# Patient Record
Sex: FEMALE | Race: WHITE | NOT HISPANIC OR LATINO | Employment: OTHER | URBAN - METROPOLITAN AREA
[De-identification: names, ages, dates, MRNs, and addresses within clinical notes are randomized per-mention and may not be internally consistent; named-entity substitution may affect disease eponyms.]

---

## 2020-04-11 ENCOUNTER — HOSPITAL ENCOUNTER (INPATIENT)
Facility: HOSPITAL | Age: 80
LOS: 4 days | Discharge: HOME | DRG: 843 | End: 2020-04-15
Attending: EMERGENCY MEDICINE | Admitting: INTERNAL MEDICINE
Payer: MEDICARE

## 2020-04-11 DIAGNOSIS — G93.40 ENCEPHALOPATHY: ICD-10-CM

## 2020-04-11 DIAGNOSIS — N30.00 ACUTE CYSTITIS WITHOUT HEMATURIA: ICD-10-CM

## 2020-04-11 DIAGNOSIS — E83.52 HYPERCALCEMIA: ICD-10-CM

## 2020-04-11 DIAGNOSIS — E83.52 HYPERCALCEMIA OF MALIGNANCY: Primary | ICD-10-CM

## 2020-04-11 DIAGNOSIS — I10 HYPERTENSION, UNSPECIFIED TYPE: ICD-10-CM

## 2020-04-11 DIAGNOSIS — G93.41 ENCEPHALOPATHY, METABOLIC: ICD-10-CM

## 2020-04-11 PROBLEM — I50.32 CHRONIC DIASTOLIC HEART FAILURE: Status: ACTIVE | Noted: 2020-04-11

## 2020-04-11 LAB
ALBUMIN SERPL BCP-MCNC: 3.7 G/DL (ref 3.5–5.2)
ALP SERPL-CCNC: 259 U/L (ref 55–135)
ALT SERPL W/O P-5'-P-CCNC: 71 U/L (ref 10–44)
ANION GAP SERPL CALC-SCNC: 11 MMOL/L (ref 8–16)
AST SERPL-CCNC: 96 U/L (ref 10–40)
BACTERIA #/AREA URNS HPF: ABNORMAL /HPF
BASOPHILS # BLD AUTO: 0.01 K/UL (ref 0–0.2)
BASOPHILS NFR BLD: 0.2 % (ref 0–1.9)
BILIRUB SERPL-MCNC: 0.6 MG/DL (ref 0.1–1)
BILIRUB UR QL STRIP: NEGATIVE
BNP SERPL-MCNC: 152 PG/ML (ref 0–99)
BUN SERPL-MCNC: 23 MG/DL (ref 8–23)
CALCIUM SERPL-MCNC: 14.1 MG/DL (ref 8.7–10.5)
CHLORIDE SERPL-SCNC: 103 MMOL/L (ref 95–110)
CK SERPL-CCNC: 59 U/L (ref 20–180)
CLARITY UR: CLEAR
CO2 SERPL-SCNC: 24 MMOL/L (ref 23–29)
COLOR UR: YELLOW
CREAT SERPL-MCNC: 2 MG/DL (ref 0.5–1.4)
CRP SERPL-MCNC: 7.6 MG/L (ref 0–8.2)
DIFFERENTIAL METHOD: ABNORMAL
EOSINOPHIL # BLD AUTO: 0.1 K/UL (ref 0–0.5)
EOSINOPHIL NFR BLD: 1.1 % (ref 0–8)
ERYTHROCYTE [DISTWIDTH] IN BLOOD BY AUTOMATED COUNT: 14.5 % (ref 11.5–14.5)
EST. GFR  (AFRICAN AMERICAN): 26.6 ML/MIN/1.73 M^2
EST. GFR  (NON AFRICAN AMERICAN): 23.1 ML/MIN/1.73 M^2
GLUCOSE SERPL-MCNC: 359 MG/DL (ref 70–110)
GLUCOSE UR QL STRIP: ABNORMAL
HCT VFR BLD AUTO: 40.7 % (ref 37–48.5)
HGB BLD-MCNC: 12.6 G/DL (ref 12–16)
HGB UR QL STRIP: ABNORMAL
HYALINE CASTS #/AREA URNS LPF: 22 /LPF
IMM GRANULOCYTES # BLD AUTO: 0.02 K/UL (ref 0–0.04)
IMM GRANULOCYTES NFR BLD AUTO: 0.3 % (ref 0–0.5)
INFLUENZA A, MOLECULAR: NEGATIVE
INFLUENZA B, MOLECULAR: NEGATIVE
KETONES UR QL STRIP: NEGATIVE
LACTATE SERPL-SCNC: 2.3 MMOL/L (ref 0.5–2.2)
LACTATE SERPL-SCNC: 2.3 MMOL/L (ref 0.5–2.2)
LDH SERPL L TO P-CCNC: 226 U/L (ref 110–260)
LEUKOCYTE ESTERASE UR QL STRIP: NEGATIVE
LIPASE SERPL-CCNC: 38 U/L (ref 4–60)
LYMPHOCYTES # BLD AUTO: 1.2 K/UL (ref 1–4.8)
LYMPHOCYTES NFR BLD: 19.5 % (ref 18–48)
MCH RBC QN AUTO: 28.2 PG (ref 27–31)
MCHC RBC AUTO-ENTMCNC: 31 G/DL (ref 32–36)
MCV RBC AUTO: 91 FL (ref 82–98)
MICROSCOPIC COMMENT: ABNORMAL
MONOCYTES # BLD AUTO: 0.7 K/UL (ref 0.3–1)
MONOCYTES NFR BLD: 11.3 % (ref 4–15)
NEUTROPHILS # BLD AUTO: 4.3 K/UL (ref 1.8–7.7)
NEUTROPHILS NFR BLD: 67.6 % (ref 38–73)
NITRITE UR QL STRIP: POSITIVE
NRBC BLD-RTO: 0 /100 WBC
PH UR STRIP: 5 [PH] (ref 5–8)
PLATELET # BLD AUTO: 275 K/UL (ref 150–350)
PMV BLD AUTO: 11 FL (ref 9.2–12.9)
POCT GLUCOSE: 351 MG/DL (ref 70–110)
POTASSIUM SERPL-SCNC: 4.5 MMOL/L (ref 3.5–5.1)
PROT SERPL-MCNC: 7.8 G/DL (ref 6–8.4)
PROT UR QL STRIP: ABNORMAL
RBC # BLD AUTO: 4.47 M/UL (ref 4–5.4)
RBC #/AREA URNS HPF: 0 /HPF (ref 0–4)
SARS-COV-2 RDRP RESP QL NAA+PROBE: NEGATIVE
SODIUM SERPL-SCNC: 138 MMOL/L (ref 136–145)
SP GR UR STRIP: 1.02 (ref 1–1.03)
SPECIMEN SOURCE: NORMAL
SQUAMOUS #/AREA URNS HPF: 5 /HPF
TROPONIN I SERPL DL<=0.01 NG/ML-MCNC: 0.01 NG/ML (ref 0–0.03)
URN SPEC COLLECT METH UR: ABNORMAL
UROBILINOGEN UR STRIP-ACNC: 1 EU/DL
WBC # BLD AUTO: 6.31 K/UL (ref 3.9–12.7)
WBC #/AREA URNS HPF: 35 /HPF (ref 0–5)
WBC CLUMPS URNS QL MICRO: ABNORMAL
YEAST URNS QL MICRO: ABNORMAL

## 2020-04-11 PROCEDURE — 87088 URINE BACTERIA CULTURE: CPT

## 2020-04-11 PROCEDURE — 63600175 PHARM REV CODE 636 W HCPCS: Performed by: EMERGENCY MEDICINE

## 2020-04-11 PROCEDURE — 87077 CULTURE AEROBIC IDENTIFY: CPT

## 2020-04-11 PROCEDURE — 96375 TX/PRO/DX INJ NEW DRUG ADDON: CPT

## 2020-04-11 PROCEDURE — 85025 COMPLETE CBC W/AUTO DIFF WBC: CPT

## 2020-04-11 PROCEDURE — 83615 LACTATE (LD) (LDH) ENZYME: CPT | Mod: ER

## 2020-04-11 PROCEDURE — 82962 GLUCOSE BLOOD TEST: CPT | Mod: 59

## 2020-04-11 PROCEDURE — 99291 CRITICAL CARE FIRST HOUR: CPT | Mod: 25

## 2020-04-11 PROCEDURE — 86140 C-REACTIVE PROTEIN: CPT | Mod: ER

## 2020-04-11 PROCEDURE — 80053 COMPREHEN METABOLIC PANEL: CPT | Mod: ER

## 2020-04-11 PROCEDURE — 83880 ASSAY OF NATRIURETIC PEPTIDE: CPT

## 2020-04-11 PROCEDURE — 11000001 HC ACUTE MED/SURG PRIVATE ROOM

## 2020-04-11 PROCEDURE — 82550 ASSAY OF CK (CPK): CPT | Mod: ER

## 2020-04-11 PROCEDURE — 87186 SC STD MICRODIL/AGAR DIL: CPT

## 2020-04-11 PROCEDURE — 81000 URINALYSIS NONAUTO W/SCOPE: CPT

## 2020-04-11 PROCEDURE — 96365 THER/PROPH/DIAG IV INF INIT: CPT

## 2020-04-11 PROCEDURE — 83605 ASSAY OF LACTIC ACID: CPT | Mod: ER

## 2020-04-11 PROCEDURE — 96361 HYDRATE IV INFUSION ADD-ON: CPT

## 2020-04-11 PROCEDURE — 36415 COLL VENOUS BLD VENIPUNCTURE: CPT

## 2020-04-11 PROCEDURE — U0002 COVID-19 LAB TEST NON-CDC: HCPCS

## 2020-04-11 PROCEDURE — 87040 BLOOD CULTURE FOR BACTERIA: CPT

## 2020-04-11 PROCEDURE — 87086 URINE CULTURE/COLONY COUNT: CPT

## 2020-04-11 PROCEDURE — 83690 ASSAY OF LIPASE: CPT | Mod: ER

## 2020-04-11 PROCEDURE — 25000003 PHARM REV CODE 250: Performed by: EMERGENCY MEDICINE

## 2020-04-11 PROCEDURE — 87502 INFLUENZA DNA AMP PROBE: CPT

## 2020-04-11 PROCEDURE — 82728 ASSAY OF FERRITIN: CPT

## 2020-04-11 PROCEDURE — 84484 ASSAY OF TROPONIN QUANT: CPT

## 2020-04-11 RX ORDER — NICARDIPINE HYDROCHLORIDE 0.2 MG/ML
5 INJECTION INTRAVENOUS CONTINUOUS
Status: DISCONTINUED | OUTPATIENT
Start: 2020-04-12 | End: 2020-04-12

## 2020-04-11 RX ORDER — SIMVASTATIN 40 MG/1
40 TABLET, FILM COATED ORAL NIGHTLY
Status: ON HOLD | COMMUNITY
Start: 2013-01-20 | End: 2020-04-15 | Stop reason: HOSPADM

## 2020-04-11 RX ORDER — HYDROCODONE BITARTRATE AND ACETAMINOPHEN 5; 325 MG/1; MG/1
1 TABLET ORAL
Status: ON HOLD | COMMUNITY
Start: 2020-01-09 | End: 2020-04-15 | Stop reason: HOSPADM

## 2020-04-11 RX ORDER — METOLAZONE 5 MG/1
5 TABLET ORAL DAILY
Status: ON HOLD | COMMUNITY
Start: 2019-08-19 | End: 2020-04-15 | Stop reason: HOSPADM

## 2020-04-11 RX ORDER — ALPRAZOLAM 0.5 MG/1
0.5 TABLET ORAL 2 TIMES DAILY
COMMUNITY
Start: 2020-02-20

## 2020-04-11 RX ORDER — HYDRALAZINE HYDROCHLORIDE 50 MG/1
50 TABLET, FILM COATED ORAL 2 TIMES DAILY
COMMUNITY
Start: 2020-01-10

## 2020-04-11 RX ORDER — CALCITRIOL 0.25 UG/1
CAPSULE ORAL
Status: ON HOLD | COMMUNITY
Start: 2020-02-02 | End: 2020-04-15 | Stop reason: HOSPADM

## 2020-04-11 RX ORDER — AMIODARONE HYDROCHLORIDE 200 MG/1
200 TABLET ORAL DAILY
COMMUNITY
Start: 2020-02-06

## 2020-04-11 RX ORDER — NIFEDIPINE 90 MG/1
90 TABLET, EXTENDED RELEASE ORAL DAILY
COMMUNITY
Start: 2020-04-07

## 2020-04-11 RX ORDER — METOPROLOL SUCCINATE 25 MG/1
25 TABLET, EXTENDED RELEASE ORAL DAILY
COMMUNITY
Start: 2020-04-04

## 2020-04-11 RX ORDER — LEVOTHYROXINE SODIUM 88 UG/1
88 TABLET ORAL
COMMUNITY
Start: 2020-04-07

## 2020-04-11 RX ORDER — POTASSIUM CHLORIDE 20 MEQ/1
20 TABLET, EXTENDED RELEASE ORAL
COMMUNITY

## 2020-04-11 RX ORDER — HYDRALAZINE HYDROCHLORIDE 25 MG/1
25 TABLET, FILM COATED ORAL
Status: COMPLETED | OUTPATIENT
Start: 2020-04-11 | End: 2020-04-11

## 2020-04-11 RX ORDER — DOXAZOSIN 4 MG/1
TABLET ORAL
Status: ON HOLD | COMMUNITY
Start: 2020-04-07 | End: 2020-04-15 | Stop reason: HOSPADM

## 2020-04-11 RX ORDER — APIXABAN 2.5 MG/1
2.5 TABLET, FILM COATED ORAL 2 TIMES DAILY
Status: ON HOLD | COMMUNITY
Start: 2020-03-25 | End: 2020-04-15 | Stop reason: SDUPTHER

## 2020-04-11 RX ORDER — CALCITONIN SALMON 200 [USP'U]/ML
4 INJECTION, SOLUTION INTRAMUSCULAR; SUBCUTANEOUS ONCE
Status: COMPLETED | OUTPATIENT
Start: 2020-04-12 | End: 2020-04-12

## 2020-04-11 RX ORDER — HYDRALAZINE HYDROCHLORIDE 20 MG/ML
10 INJECTION INTRAMUSCULAR; INTRAVENOUS
Status: COMPLETED | OUTPATIENT
Start: 2020-04-11 | End: 2020-04-11

## 2020-04-11 RX ORDER — GLIMEPIRIDE 2 MG/1
2 TABLET ORAL 2 TIMES DAILY
Status: ON HOLD | COMMUNITY
Start: 2020-03-10 | End: 2020-04-15 | Stop reason: SDUPTHER

## 2020-04-11 RX ORDER — FUROSEMIDE 40 MG/1
40 TABLET ORAL 2 TIMES DAILY
Status: ON HOLD | COMMUNITY
Start: 2019-08-19 | End: 2020-04-15 | Stop reason: SDUPTHER

## 2020-04-11 RX ORDER — BENZONATATE 100 MG/1
CAPSULE ORAL
COMMUNITY
Start: 2020-02-20

## 2020-04-11 RX ORDER — TRAMADOL HYDROCHLORIDE 50 MG/1
TABLET ORAL
COMMUNITY
Start: 2019-09-27

## 2020-04-11 RX ORDER — PANTOPRAZOLE SODIUM 40 MG/1
40 TABLET, DELAYED RELEASE ORAL DAILY
COMMUNITY
Start: 2020-01-31

## 2020-04-11 RX ORDER — SODIUM CHLORIDE 9 MG/ML
1000 INJECTION, SOLUTION INTRAVENOUS
Status: COMPLETED | OUTPATIENT
Start: 2020-04-11 | End: 2020-04-12

## 2020-04-11 RX ORDER — TIZANIDINE 4 MG/1
TABLET ORAL
Status: ON HOLD | COMMUNITY
Start: 2019-12-05 | End: 2020-04-15 | Stop reason: HOSPADM

## 2020-04-11 RX ADMIN — HYDRALAZINE HYDROCHLORIDE 25 MG: 25 TABLET, FILM COATED ORAL at 10:04

## 2020-04-11 RX ADMIN — HYDRALAZINE HYDROCHLORIDE 10 MG: 20 INJECTION INTRAMUSCULAR; INTRAVENOUS at 10:04

## 2020-04-11 RX ADMIN — HYDRALAZINE HYDROCHLORIDE 25 MG: 25 TABLET, FILM COATED ORAL at 08:04

## 2020-04-11 RX ADMIN — SODIUM CHLORIDE 1000 ML: 0.9 INJECTION, SOLUTION INTRAVENOUS at 11:04

## 2020-04-11 RX ADMIN — CEFTRIAXONE SODIUM 2 G: 2 INJECTION, SOLUTION INTRAVENOUS at 08:04

## 2020-04-11 RX ADMIN — NICARDIPINE HYDROCHLORIDE 5 MG/HR: 0.2 INJECTION, SOLUTION INTRAVENOUS at 11:04

## 2020-04-11 RX ADMIN — SODIUM CHLORIDE, SODIUM LACTATE, POTASSIUM CHLORIDE, AND CALCIUM CHLORIDE 1000 ML: .6; .31; .03; .02 INJECTION, SOLUTION INTRAVENOUS at 10:04

## 2020-04-12 PROBLEM — I82.409 DVT, LOWER EXTREMITY: Status: ACTIVE | Noted: 2020-04-12

## 2020-04-12 PROBLEM — I48.91 ATRIAL FIBRILLATION: Chronic | Status: ACTIVE | Noted: 2020-04-12

## 2020-04-12 PROBLEM — N18.4 CKD (CHRONIC KIDNEY DISEASE) STAGE 4, GFR 15-29 ML/MIN: Status: ACTIVE | Noted: 2020-04-12

## 2020-04-12 PROBLEM — E11.9 DIABETES: Status: ACTIVE | Noted: 2020-04-12

## 2020-04-12 PROBLEM — I50.30 DIASTOLIC HEART FAILURE: Status: ACTIVE | Noted: 2020-04-11

## 2020-04-12 PROBLEM — E87.8 DISORDER OF ELECTROLYTES: Status: ACTIVE | Noted: 2020-04-12

## 2020-04-12 LAB
25(OH)D3+25(OH)D2 SERPL-MCNC: 32 NG/ML (ref 30–96)
ALBUMIN SERPL BCP-MCNC: 3.4 G/DL (ref 3.5–5.2)
ALBUMIN SERPL BCP-MCNC: 3.4 G/DL (ref 3.5–5.2)
ALP SERPL-CCNC: 242 U/L (ref 55–135)
ALT SERPL W/O P-5'-P-CCNC: 67 U/L (ref 10–44)
ANION GAP SERPL CALC-SCNC: 12 MMOL/L (ref 8–16)
ANION GAP SERPL CALC-SCNC: 12 MMOL/L (ref 8–16)
AST SERPL-CCNC: 86 U/L (ref 10–40)
BASOPHILS # BLD AUTO: 0.02 K/UL (ref 0–0.2)
BASOPHILS NFR BLD: 0.2 % (ref 0–1.9)
BILIRUB SERPL-MCNC: 0.5 MG/DL (ref 0.1–1)
BUN SERPL-MCNC: 20 MG/DL (ref 8–23)
BUN SERPL-MCNC: 20 MG/DL (ref 8–23)
CALCIUM SERPL-MCNC: 13.3 MG/DL (ref 8.7–10.5)
CALCIUM SERPL-MCNC: 13.3 MG/DL (ref 8.7–10.5)
CHLORIDE SERPL-SCNC: 109 MMOL/L (ref 95–110)
CHLORIDE SERPL-SCNC: 109 MMOL/L (ref 95–110)
CO2 SERPL-SCNC: 19 MMOL/L (ref 23–29)
CO2 SERPL-SCNC: 19 MMOL/L (ref 23–29)
CREAT SERPL-MCNC: 1.5 MG/DL (ref 0.5–1.4)
CREAT SERPL-MCNC: 1.5 MG/DL (ref 0.5–1.4)
DIFFERENTIAL METHOD: ABNORMAL
EOSINOPHIL # BLD AUTO: 0 K/UL (ref 0–0.5)
EOSINOPHIL NFR BLD: 0.1 % (ref 0–8)
ERYTHROCYTE [DISTWIDTH] IN BLOOD BY AUTOMATED COUNT: 14.4 % (ref 11.5–14.5)
EST. GFR  (AFRICAN AMERICAN): 38 ML/MIN/1.73 M^2
EST. GFR  (AFRICAN AMERICAN): 38 ML/MIN/1.73 M^2
EST. GFR  (NON AFRICAN AMERICAN): 33 ML/MIN/1.73 M^2
EST. GFR  (NON AFRICAN AMERICAN): 33 ML/MIN/1.73 M^2
ESTIMATED AVG GLUCOSE: 166 MG/DL (ref 68–131)
FERRITIN SERPL-MCNC: 68 NG/ML (ref 20–300)
GGT SERPL-CCNC: 430 U/L (ref 8–55)
GLUCOSE SERPL-MCNC: 325 MG/DL (ref 70–110)
GLUCOSE SERPL-MCNC: 325 MG/DL (ref 70–110)
HBA1C MFR BLD HPLC: 7.4 % (ref 4–5.6)
HCT VFR BLD AUTO: 38.1 % (ref 37–48.5)
HGB BLD-MCNC: 11.9 G/DL (ref 12–16)
IMM GRANULOCYTES # BLD AUTO: 0.03 K/UL (ref 0–0.04)
IMM GRANULOCYTES NFR BLD AUTO: 0.3 % (ref 0–0.5)
INR PPP: 1 (ref 0.8–1.2)
LACTATE SERPL-SCNC: 2.3 MMOL/L (ref 0.5–2.2)
LYMPHOCYTES # BLD AUTO: 0.9 K/UL (ref 1–4.8)
LYMPHOCYTES NFR BLD: 9.7 % (ref 18–48)
MAGNESIUM SERPL-MCNC: 1.5 MG/DL (ref 1.6–2.6)
MCH RBC QN AUTO: 28.3 PG (ref 27–31)
MCHC RBC AUTO-ENTMCNC: 31.2 G/DL (ref 32–36)
MCV RBC AUTO: 91 FL (ref 82–98)
MONOCYTES # BLD AUTO: 0.7 K/UL (ref 0.3–1)
MONOCYTES NFR BLD: 7.3 % (ref 4–15)
NEUTROPHILS # BLD AUTO: 7.8 K/UL (ref 1.8–7.7)
NEUTROPHILS NFR BLD: 82.4 % (ref 38–73)
NRBC BLD-RTO: 0 /100 WBC
PHOSPHATE SERPL-MCNC: 1.5 MG/DL (ref 2.7–4.5)
PLATELET # BLD AUTO: 309 K/UL (ref 150–350)
PMV BLD AUTO: 11.1 FL (ref 9.2–12.9)
POCT GLUCOSE: 240 MG/DL (ref 70–110)
POCT GLUCOSE: 267 MG/DL (ref 70–110)
POCT GLUCOSE: 280 MG/DL (ref 70–110)
POCT GLUCOSE: 282 MG/DL (ref 70–110)
POTASSIUM SERPL-SCNC: 3.9 MMOL/L (ref 3.5–5.1)
POTASSIUM SERPL-SCNC: 3.9 MMOL/L (ref 3.5–5.1)
PROT SERPL-MCNC: 7.2 G/DL (ref 6–8.4)
PROTHROMBIN TIME: 10.9 SEC (ref 9–12.5)
PTH-INTACT SERPL-MCNC: 5.9 PG/ML (ref 9–77)
RBC # BLD AUTO: 4.21 M/UL (ref 4–5.4)
SODIUM SERPL-SCNC: 140 MMOL/L (ref 136–145)
SODIUM SERPL-SCNC: 140 MMOL/L (ref 136–145)
WBC # BLD AUTO: 9.46 K/UL (ref 3.9–12.7)

## 2020-04-12 PROCEDURE — 63600175 PHARM REV CODE 636 W HCPCS: Performed by: INTERNAL MEDICINE

## 2020-04-12 PROCEDURE — 25000003 PHARM REV CODE 250: Performed by: NURSE PRACTITIONER

## 2020-04-12 PROCEDURE — 83605 ASSAY OF LACTIC ACID: CPT

## 2020-04-12 PROCEDURE — 25000003 PHARM REV CODE 250: Performed by: EMERGENCY MEDICINE

## 2020-04-12 PROCEDURE — 84165 PATHOLOGIST INTERPRETATION SPE: ICD-10-PCS | Mod: 26,,, | Performed by: PATHOLOGY

## 2020-04-12 PROCEDURE — 63600175 PHARM REV CODE 636 W HCPCS: Performed by: NURSE PRACTITIONER

## 2020-04-12 PROCEDURE — 85025 COMPLETE CBC W/AUTO DIFF WBC: CPT

## 2020-04-12 PROCEDURE — 25000003 PHARM REV CODE 250: Performed by: INTERNAL MEDICINE

## 2020-04-12 PROCEDURE — 36415 COLL VENOUS BLD VENIPUNCTURE: CPT

## 2020-04-12 PROCEDURE — 82977 ASSAY OF GGT: CPT

## 2020-04-12 PROCEDURE — 80053 COMPREHEN METABOLIC PANEL: CPT

## 2020-04-12 PROCEDURE — 11000001 HC ACUTE MED/SURG PRIVATE ROOM

## 2020-04-12 PROCEDURE — 99223 1ST HOSP IP/OBS HIGH 75: CPT | Mod: ,,, | Performed by: INTERNAL MEDICINE

## 2020-04-12 PROCEDURE — 85610 PROTHROMBIN TIME: CPT

## 2020-04-12 PROCEDURE — 82306 VITAMIN D 25 HYDROXY: CPT

## 2020-04-12 PROCEDURE — 82397 CHEMILUMINESCENT ASSAY: CPT

## 2020-04-12 PROCEDURE — 63600175 PHARM REV CODE 636 W HCPCS: Mod: JG | Performed by: EMERGENCY MEDICINE

## 2020-04-12 PROCEDURE — 83036 HEMOGLOBIN GLYCOSYLATED A1C: CPT

## 2020-04-12 PROCEDURE — 80069 RENAL FUNCTION PANEL: CPT

## 2020-04-12 PROCEDURE — 86301 IMMUNOASSAY TUMOR CA 19-9: CPT

## 2020-04-12 PROCEDURE — 82378 CARCINOEMBRYONIC ANTIGEN: CPT

## 2020-04-12 PROCEDURE — 82105 ALPHA-FETOPROTEIN SERUM: CPT

## 2020-04-12 PROCEDURE — 86304 IMMUNOASSAY TUMOR CA 125: CPT

## 2020-04-12 PROCEDURE — 84165 PROTEIN E-PHORESIS SERUM: CPT | Mod: 26,,, | Performed by: PATHOLOGY

## 2020-04-12 PROCEDURE — 99900037 HC PT THERAPY SCREENING (STAT)

## 2020-04-12 PROCEDURE — 83970 ASSAY OF PARATHORMONE: CPT

## 2020-04-12 PROCEDURE — 99223 PR INITIAL HOSPITAL CARE,LEVL III: ICD-10-PCS | Mod: ,,, | Performed by: INTERNAL MEDICINE

## 2020-04-12 PROCEDURE — 84165 PROTEIN E-PHORESIS SERUM: CPT

## 2020-04-12 PROCEDURE — 83735 ASSAY OF MAGNESIUM: CPT

## 2020-04-12 RX ORDER — HYDRALAZINE HYDROCHLORIDE 20 MG/ML
INJECTION INTRAMUSCULAR; INTRAVENOUS
Status: DISCONTINUED
Start: 2020-04-12 | End: 2020-04-12 | Stop reason: WASHOUT

## 2020-04-12 RX ORDER — NIFEDIPINE 30 MG/1
90 TABLET, EXTENDED RELEASE ORAL DAILY
Status: DISCONTINUED | OUTPATIENT
Start: 2020-04-12 | End: 2020-04-15 | Stop reason: HOSPADM

## 2020-04-12 RX ORDER — HYDRALAZINE HYDROCHLORIDE 50 MG/1
50 TABLET, FILM COATED ORAL 2 TIMES DAILY
Status: DISCONTINUED | OUTPATIENT
Start: 2020-04-12 | End: 2020-04-15 | Stop reason: HOSPADM

## 2020-04-12 RX ORDER — GLUCAGON 1 MG
1 KIT INJECTION
Status: DISCONTINUED | OUTPATIENT
Start: 2020-04-12 | End: 2020-04-12

## 2020-04-12 RX ORDER — SODIUM CHLORIDE 9 MG/ML
1000 INJECTION, SOLUTION INTRAVENOUS CONTINUOUS
Status: DISCONTINUED | OUTPATIENT
Start: 2020-04-12 | End: 2020-04-12

## 2020-04-12 RX ORDER — SODIUM CHLORIDE 0.9 % (FLUSH) 0.9 %
10 SYRINGE (ML) INJECTION
Status: DISCONTINUED | OUTPATIENT
Start: 2020-04-12 | End: 2020-04-15 | Stop reason: HOSPADM

## 2020-04-12 RX ORDER — IBUPROFEN 200 MG
24 TABLET ORAL
Status: DISCONTINUED | OUTPATIENT
Start: 2020-04-12 | End: 2020-04-13

## 2020-04-12 RX ORDER — INSULIN ASPART 100 [IU]/ML
0-5 INJECTION, SOLUTION INTRAVENOUS; SUBCUTANEOUS
Status: DISCONTINUED | OUTPATIENT
Start: 2020-04-12 | End: 2020-04-13

## 2020-04-12 RX ORDER — PANTOPRAZOLE SODIUM 40 MG/1
40 TABLET, DELAYED RELEASE ORAL DAILY
Status: DISCONTINUED | OUTPATIENT
Start: 2020-04-12 | End: 2020-04-15 | Stop reason: HOSPADM

## 2020-04-12 RX ORDER — FUROSEMIDE 40 MG/1
80 TABLET ORAL DAILY
Status: DISCONTINUED | OUTPATIENT
Start: 2020-04-13 | End: 2020-04-12

## 2020-04-12 RX ORDER — SODIUM CHLORIDE 9 MG/ML
1000 INJECTION, SOLUTION INTRAVENOUS CONTINUOUS
Status: ACTIVE | OUTPATIENT
Start: 2020-04-12 | End: 2020-04-12

## 2020-04-12 RX ORDER — FUROSEMIDE 10 MG/ML
60 INJECTION INTRAMUSCULAR; INTRAVENOUS ONCE
Status: COMPLETED | OUTPATIENT
Start: 2020-04-12 | End: 2020-04-12

## 2020-04-12 RX ORDER — IBUPROFEN 200 MG
16 TABLET ORAL
Status: DISCONTINUED | OUTPATIENT
Start: 2020-04-12 | End: 2020-04-12

## 2020-04-12 RX ORDER — DOXAZOSIN 2 MG/1
4 TABLET ORAL DAILY
Status: DISCONTINUED | OUTPATIENT
Start: 2020-04-12 | End: 2020-04-15 | Stop reason: HOSPADM

## 2020-04-12 RX ORDER — ONDANSETRON 4 MG/1
4 TABLET, ORALLY DISINTEGRATING ORAL EVERY 6 HOURS PRN
Status: DISCONTINUED | OUTPATIENT
Start: 2020-04-12 | End: 2020-04-15 | Stop reason: HOSPADM

## 2020-04-12 RX ORDER — ALPRAZOLAM 0.5 MG/1
0.5 TABLET ORAL 2 TIMES DAILY
Status: DISCONTINUED | OUTPATIENT
Start: 2020-04-12 | End: 2020-04-15 | Stop reason: HOSPADM

## 2020-04-12 RX ORDER — FUROSEMIDE 10 MG/ML
60 INJECTION INTRAMUSCULAR; INTRAVENOUS
Status: COMPLETED | OUTPATIENT
Start: 2020-04-12 | End: 2020-04-13

## 2020-04-12 RX ORDER — IBUPROFEN 200 MG
24 TABLET ORAL
Status: DISCONTINUED | OUTPATIENT
Start: 2020-04-12 | End: 2020-04-12

## 2020-04-12 RX ORDER — HYDRALAZINE HYDROCHLORIDE 20 MG/ML
10 INJECTION INTRAMUSCULAR; INTRAVENOUS EVERY 8 HOURS PRN
Status: DISCONTINUED | OUTPATIENT
Start: 2020-04-12 | End: 2020-04-15 | Stop reason: HOSPADM

## 2020-04-12 RX ORDER — LEVOTHYROXINE SODIUM 88 UG/1
88 TABLET ORAL
Status: DISCONTINUED | OUTPATIENT
Start: 2020-04-12 | End: 2020-04-15 | Stop reason: HOSPADM

## 2020-04-12 RX ORDER — IBUPROFEN 200 MG
16 TABLET ORAL
Status: DISCONTINUED | OUTPATIENT
Start: 2020-04-12 | End: 2020-04-13

## 2020-04-12 RX ORDER — GLUCAGON 1 MG
1 KIT INJECTION
Status: DISCONTINUED | OUTPATIENT
Start: 2020-04-12 | End: 2020-04-13

## 2020-04-12 RX ORDER — POLYETHYLENE GLYCOL 3350 17 G/17G
17 POWDER, FOR SOLUTION ORAL DAILY
Status: DISCONTINUED | OUTPATIENT
Start: 2020-04-12 | End: 2020-04-15 | Stop reason: HOSPADM

## 2020-04-12 RX ORDER — AMIODARONE HYDROCHLORIDE 200 MG/1
200 TABLET ORAL DAILY
Status: DISCONTINUED | OUTPATIENT
Start: 2020-04-12 | End: 2020-04-15 | Stop reason: HOSPADM

## 2020-04-12 RX ORDER — SIMVASTATIN 20 MG/1
40 TABLET, FILM COATED ORAL NIGHTLY
Status: DISCONTINUED | OUTPATIENT
Start: 2020-04-12 | End: 2020-04-15 | Stop reason: HOSPADM

## 2020-04-12 RX ORDER — MAGNESIUM SULFATE HEPTAHYDRATE 40 MG/ML
2 INJECTION, SOLUTION INTRAVENOUS ONCE
Status: COMPLETED | OUTPATIENT
Start: 2020-04-12 | End: 2020-04-12

## 2020-04-12 RX ORDER — METOPROLOL SUCCINATE 25 MG/1
25 TABLET, EXTENDED RELEASE ORAL DAILY
Status: DISCONTINUED | OUTPATIENT
Start: 2020-04-12 | End: 2020-04-15 | Stop reason: HOSPADM

## 2020-04-12 RX ORDER — ONDANSETRON 2 MG/ML
4 INJECTION INTRAMUSCULAR; INTRAVENOUS EVERY 8 HOURS PRN
Status: DISCONTINUED | OUTPATIENT
Start: 2020-04-12 | End: 2020-04-15 | Stop reason: HOSPADM

## 2020-04-12 RX ADMIN — FUROSEMIDE 60 MG: 10 INJECTION, SOLUTION INTRAMUSCULAR; INTRAVENOUS at 12:04

## 2020-04-12 RX ADMIN — METOPROLOL SUCCINATE 25 MG: 25 TABLET, EXTENDED RELEASE ORAL at 03:04

## 2020-04-12 RX ADMIN — ONDANSETRON 4 MG: 2 INJECTION INTRAMUSCULAR; INTRAVENOUS at 09:04

## 2020-04-12 RX ADMIN — PANTOPRAZOLE SODIUM 40 MG: 40 TABLET, DELAYED RELEASE ORAL at 09:04

## 2020-04-12 RX ADMIN — FUROSEMIDE 60 MG: 10 INJECTION, SOLUTION INTRAMUSCULAR; INTRAVENOUS at 04:04

## 2020-04-12 RX ADMIN — POLYETHYLENE GLYCOL 3350 17 G: 17 POWDER, FOR SOLUTION ORAL at 08:04

## 2020-04-12 RX ADMIN — NICARDIPINE HYDROCHLORIDE 10 MG/HR: 0.2 INJECTION, SOLUTION INTRAVENOUS at 04:04

## 2020-04-12 RX ADMIN — HYDRALAZINE HYDROCHLORIDE 10 MG: 20 INJECTION INTRAMUSCULAR; INTRAVENOUS at 04:04

## 2020-04-12 RX ADMIN — INSULIN ASPART 2 UNITS: 100 INJECTION, SOLUTION INTRAVENOUS; SUBCUTANEOUS at 05:04

## 2020-04-12 RX ADMIN — SODIUM PHOSPHATE, MONOBASIC, MONOHYDRATE 30 MMOL: 276; 142 INJECTION, SOLUTION INTRAVENOUS at 02:04

## 2020-04-12 RX ADMIN — SODIUM CHLORIDE 1000 ML: 0.9 INJECTION, SOLUTION INTRAVENOUS at 02:04

## 2020-04-12 RX ADMIN — LEVOTHYROXINE SODIUM 88 MCG: 88 TABLET ORAL at 05:04

## 2020-04-12 RX ADMIN — SIMVASTATIN 40 MG: 20 TABLET, FILM COATED ORAL at 08:04

## 2020-04-12 RX ADMIN — NICARDIPINE HYDROCHLORIDE 5 MG/HR: 0.2 INJECTION, SOLUTION INTRAVENOUS at 09:04

## 2020-04-12 RX ADMIN — CEFTRIAXONE 1 G: 1 INJECTION, SOLUTION INTRAVENOUS at 08:04

## 2020-04-12 RX ADMIN — APIXABAN 2.5 MG: 2.5 TABLET, FILM COATED ORAL at 09:04

## 2020-04-12 RX ADMIN — SODIUM CHLORIDE 1000 ML: 0.9 INJECTION, SOLUTION INTRAVENOUS at 12:04

## 2020-04-12 RX ADMIN — AMIODARONE HYDROCHLORIDE 200 MG: 200 TABLET ORAL at 09:04

## 2020-04-12 RX ADMIN — CALCITONIN SALMON 342 UNITS: 200 INJECTION, SOLUTION INTRAMUSCULAR; SUBCUTANEOUS at 12:04

## 2020-04-12 RX ADMIN — HYDRALAZINE HYDROCHLORIDE 50 MG: 50 TABLET, FILM COATED ORAL at 03:04

## 2020-04-12 RX ADMIN — ALPRAZOLAM 0.5 MG: 0.5 TABLET ORAL at 08:04

## 2020-04-12 RX ADMIN — DOXAZOSIN 4 MG: 2 TABLET ORAL at 09:04

## 2020-04-12 RX ADMIN — NIFEDIPINE 90 MG: 30 TABLET, FILM COATED, EXTENDED RELEASE ORAL at 09:04

## 2020-04-12 RX ADMIN — INSULIN ASPART 3 UNITS: 100 INJECTION, SOLUTION INTRAVENOUS; SUBCUTANEOUS at 12:04

## 2020-04-12 RX ADMIN — MAGNESIUM SULFATE 2 G: 2 INJECTION INTRAVENOUS at 02:04

## 2020-04-12 RX ADMIN — INSULIN ASPART 1 UNITS: 100 INJECTION, SOLUTION INTRAVENOUS; SUBCUTANEOUS at 09:04

## 2020-04-12 RX ADMIN — HYDRALAZINE HYDROCHLORIDE 50 MG: 50 TABLET, FILM COATED ORAL at 08:04

## 2020-04-12 RX ADMIN — ONDANSETRON 4 MG: 2 INJECTION INTRAMUSCULAR; INTRAVENOUS at 01:04

## 2020-04-12 RX ADMIN — SODIUM PHOSPHATE, DIBASIC AND SODIUM PHOSPHATE, MONOBASIC 1 ENEMA: 7; 19 ENEMA RECTAL at 07:04

## 2020-04-12 RX ADMIN — ALPRAZOLAM 0.5 MG: 0.5 TABLET ORAL at 09:04

## 2020-04-12 NOTE — SUBJECTIVE & OBJECTIVE
Past Medical History:   Diagnosis Date    Anxiety     Atrial fibrillation     Cerebellar stroke     CKD (chronic kidney disease) stage 4, GFR 15-29 ml/min     Coronary artery disease     Diabetes     Diastolic heart failure     DVT, lower extremity, left peroneal vein     GERD (gastroesophageal reflux disease)        Past Surgical History:   Procedure Laterality Date    APPENDECTOMY      BREAST BIOPSY      CARDIAC CATHETERIZATION      HYSTERECTOMY      TONSILLECTOMY         Review of patient's allergies indicates:   Allergen Reactions    Iodinated contrast media Swelling     Taken amiodorone    Levofloxacin     Minoxidil     Rosiglitazone        Family History     Problem Relation (Age of Onset)    Heart disease Mother, Father, Sister, Brother    Stroke Sister        Tobacco Use    Smoking status: Never Smoker   Substance and Sexual Activity    Alcohol use: Not Currently    Drug use: Not on file    Sexual activity: Not on file         Review of Systems   Constitutional: Negative for chills and fever.   HENT: Negative for congestion and sore throat.    Eyes: Negative for visual disturbance.   Respiratory: Negative for cough, shortness of breath and wheezing.    Cardiovascular: Negative for chest pain, palpitations and leg swelling.   Gastrointestinal: Positive for nausea and vomiting. Negative for abdominal pain, blood in stool, constipation and diarrhea.   Genitourinary: Negative for dysuria and hematuria.   Musculoskeletal: Positive for arthralgias, gait problem and myalgias. Negative for back pain.   Skin: Negative for rash and wound.   Neurological: Negative for dizziness, weakness, light-headedness and numbness.   Hematological: Negative for adenopathy.     Objective:     Vital Signs (Most Recent):  Temp: 97.8 °F (36.6 °C) (04/12/20 0012)  Pulse: 75 (04/12/20 0330)  Resp: 18 (04/12/20 0330)  BP: (!) 194/81 (04/12/20 0330)  SpO2: 99 % (04/12/20 0330) Vital Signs (24h Range):  Temp:  [97.8 °F  (36.6 °C)] 97.8 °F (36.6 °C)  Pulse:  [60-91] 75  Resp:  [16-21] 18  SpO2:  [97 %-100 %] 99 %  BP: (173-238)/(74-98) 194/81     Weight: 85.5 kg (188 lb 7.9 oz)  Body mass index is 31.37 kg/m².      Intake/Output Summary (Last 24 hours) at 4/12/2020 0334  Last data filed at 4/12/2020 0200  Gross per 24 hour   Intake 1341.67 ml   Output 0 ml   Net 1341.67 ml       Physical Exam   Constitutional: She is oriented to person, place, and time. She appears well-developed and well-nourished. No distress.   HENT:   Head: Normocephalic and atraumatic.   Mouth/Throat: Oropharynx is clear and moist.   Eyes: Pupils are equal, round, and reactive to light. Conjunctivae and EOM are normal.   Neck: Neck supple. No JVD present. No thyromegaly present.   Cardiovascular: Normal rate and regular rhythm. Exam reveals no gallop and no friction rub.   No murmur heard.  Pulmonary/Chest: Effort normal and breath sounds normal. She has no wheezes. She has no rales.   Abdominal: Soft. Bowel sounds are normal. She exhibits no distension. There is tenderness. There is no rebound and no guarding.   Musculoskeletal: Normal range of motion. She exhibits no edema or deformity.   Lymphadenopathy:     She has no cervical adenopathy.   Neurological: She is alert and oriented to person, place, and time. She has normal reflexes.   Very sluggish to respond to questions but answers appropriately and aware of situation.  Reports paresthesias on palpation of toes and feet.   Skin: Skin is warm and dry. No rash noted.   Psychiatric: She has a normal mood and affect. Her behavior is normal. Judgment and thought content normal.   Nursing note and vitals reviewed.      Vents:       Lines/Drains/Airways     Peripheral Intravenous Line                 Peripheral IV - Single Lumen 04/11/20 1956 20 G Left Forearm less than 1 day                Significant Labs:    CBC/Anemia Profile:  Recent Labs   Lab 04/1940   WBC 6.31   HGB 12.6   HCT 40.7      MCV  91   RDW 14.5   FERRITIN 68        Chemistries:  Recent Labs   Lab 04/11/20  1940      K 4.5      CO2 24   BUN 23   CREATININE 2.0*   CALCIUM 14.1*   ALBUMIN 3.7   PROT 7.8   BILITOT 0.6   ALKPHOS 259*   ALT 71*   AST 96*       All pertinent labs within the past 24 hours have been reviewed.    Significant Imaging:   I have reviewed all pertinent imaging results/findings within the past 24 hours.

## 2020-04-12 NOTE — NURSING
Patient to room from ER via bed transferred to bed w staff x 3. New brief applied and new pure wick inserted to wall suction. Joselyn Abdi at bedside along with Samanta BRAN and Dr Pink.

## 2020-04-12 NOTE — PLAN OF CARE
Fall precautions maintained, pt free from injuries/fall.  Repositions w assist x1  Ambulates w max assist  C/o generalized pain radiating to BLLE  POC and meds discussed, both verbalized understanding.  Side rails x2, bed locked and low, phone and call light w/in reach.  Chart check done. Will cont to monitor.

## 2020-04-12 NOTE — ED NOTES
Pt lying in bed in NAD, VSS, RR equal and unlabored. Bed is low, locked, and call light in reach. Side rails up x 2.

## 2020-04-12 NOTE — ASSESSMENT & PLAN NOTE
14.3 corrected for Albumin of 3.7.  Severe hypercalcemia of unknown etiology.  Start with volume expansion using normal saline and Calcitonin.  Per medical records her serum Calcium levels have been between 8 and 9 until now.  Draw Vitamin D and intact PTH levels.  She normally takes 80 mg tablet once a day.  Strict I&O and serial chemistries.  CXR is clear of any mass.  She had a BiRads 2 negative Mammogram last year.  Alkaline phosphatase is elevated with mild elevation of AST/ALT.  Check GGT.  Normal Saline at 200 mL/hr and Furosemide 60 mg IV every 12 hours for 3 doses.  She received one dose Calcitonin in the ED - 342 Units SC at admission.

## 2020-04-12 NOTE — HPI
Brought in to the Emergency Department because of weakness.  Fell several times at home.  Weak and unable to keep balance.  Getting worse over the last week.  Also having general body aches and started nausea and vomiting tonight.  No problems urinating but is having bad constipation for some time.  No cough, shortness of breath, or chest pain.  Denies fevers or chills.  No known recent sick contacts.  No medicaiton changes.

## 2020-04-12 NOTE — ASSESSMENT & PLAN NOTE
Renal function chemistries at baseline on admission.  Creatinine between 2 and 2.3 over the last 12 months.  Monitor urine output.  Volume expansion with normal saline and BP control.  Serial renal function chemistries.

## 2020-04-12 NOTE — ED NOTES
The patient is resting quietly, eyes closed, arouses easily to stimuli. Airway is open and patent, respirations are spontaneous, normal respiratory effort and rate noted, skin warm and dry, appearance: in no acute distress and resting comfortably. Granddaughter at bedside

## 2020-04-12 NOTE — ASSESSMENT & PLAN NOTE
Patient presents with constipation, abdominal pain, MS change (as per family) which are typical hypercalcemia symptoms.  No clear etiology. PTH appropriately suppressed. SPEP and PTHrp are pending. Vitamin D level not elevated. Will continue IV fluids (100 cc/hr) and IV lasix (60 mg IV bid). Monitor carefully for volume overload. Will follow up on labs.

## 2020-04-12 NOTE — ED PROVIDER NOTES
"SCRIBE #1 NOTE: I, Kristy Gleason, am scribing for, and in the presence of, Toni Belcher MD. I have scribed the entire note.       History     Chief Complaint   Patient presents with    Fatigue     pt c/o frequent falls, leg swelling, increased shortness of breath. +confusion per family      Review of patient's allergies indicates:   Allergen Reactions    Iodinated contrast media Swelling     Taken amiodorone    Levofloxacin     Minoxidil     Rosiglitazone          History of Present Illness     HPI    4/11/2020, 7:49 PM  History obtained from the patient and granddaughter       History of Present Illness: Veronica Sue is a 80 y.o. female patient who presents to the Emergency Department for evaluation of fatigue which onset gradually x2-3 days ago. Family reports pt has been seeming extremely weak, fatigued, and does not want to do anything. Family reports pt has been more incontinent than normal and seems slightly confused. Pt states that "everything hurts." Lastly, family notes that patient has also been having frequent falls. Symptoms are constant and moderate in severity. No mitigating or exacerbating factors reported. Associated sxs include SOB and leg swelling. Patient denies any cough, fever, chills, congestion, sore throat, CP, palpitations, abd pain, n/v/d, and all other sxs at this time. No prior tx. Family notes that pt has been noncompliant with potassium for a while. No further complaints or concerns at this time.       Arrival mode: Personal vehicle    PCP: Primary Doctor No     Past Medical History:  Past medical history reviewed not relevant      Past Surgical History:  Past surgical history reviewed not relevant      Family History:  Family history reviewed not relevant      Social History:  Social History    Social History Main Topics    Social History Main Topics    Smoking status: Unknown if ever smoked    Smokeless tobacco: Unknown if ever used    Alcohol Use: Unknown drinking history    " Drug Use: Unknown if ever used    Sexual Activity: Unknown        Review of Systems     Review of Systems   Constitutional: Positive for fatigue. Negative for chills and fever.        (+) frequent falls   HENT: Negative for congestion and sore throat.    Respiratory: Positive for shortness of breath. Negative for cough.    Cardiovascular: Positive for leg swelling. Negative for chest pain and palpitations.   Gastrointestinal: Negative for abdominal pain, diarrhea, nausea and vomiting.   Genitourinary: Negative for dysuria.   Musculoskeletal: Positive for myalgias (generalized). Negative for back pain.   Skin: Negative for rash.   Neurological: Positive for weakness. Negative for numbness.        (+) incontinence more frequent than baseline   Hematological: Does not bruise/bleed easily.   Psychiatric/Behavioral: Positive for confusion.   All other systems reviewed and are negative.     Physical Exam     Initial Vitals [04/11/20 1909]   BP Pulse Resp Temp SpO2   (!) 209/86 69 18 97.8 °F (36.6 °C) 98 %      MAP       --          Physical Exam  Nursing Notes and Vital Signs Reviewed.  Constitutional: Patient is in mild acute distress. Well-developed and well-nourished.  Head: Atraumatic. Normocephalic.  Eyes: PERRL. EOM intact. Conjunctivae are not pale. No scleral icterus.  ENT: Mucous membranes are moist. Oropharynx is clear and symmetric.    Neck: Supple. Full ROM. No lymphadenopathy.  Cardiovascular: Regular rate. Regular rhythm. No murmurs, rubs, or gallops. Distal pulses are 2+ and symmetric.  Pulmonary/Chest: No respiratory distress. Clear to auscultation bilaterally. No wheezing or rales. Trace edema bilat.   Abdominal: Soft and non-distended.  There is no tenderness.  No rebound, guarding, or rigidity. Good bowel sounds.  Genitourinary: R CVA tenderness  Musculoskeletal: Moves all extremities. No obvious deformities. No calf tenderness. No edema.   Skin: Warm and dry.  Neurological:  Sleepy, but will answer  "questions. Normal speech.  No acute focal neurological deficits are appreciated otherwise, moves all extremities on command.       ED Course   Critical Care  Date/Time: 4/11/2020 10:44 PM  Performed by: Toni Belcher MD  Authorized by: Toni Belcher MD   Direct patient critical care time: 15 minutes  Additional history critical care time: 10 minutes  Ordering / reviewing critical care time: 10 minutes  Documentation critical care time: 10 minutes  Total critical care time (exclusive of procedural time) : 45 minutes  Critical care time was exclusive of separately billable procedures and treating other patients and teaching time.  Critical care was necessary to treat or prevent imminent or life-threatening deterioration of the following conditions: acute cystitis without hematuria, encephalopathy, HTN.  Critical care was time spent personally by me on the following activities: blood draw for specimens, development of treatment plan with patient or surrogate, discussions with consultants, interpretation of cardiac output measurements, evaluation of patient's response to treatment, examination of patient, obtaining history from patient or surrogate, ordering and performing treatments and interventions, ordering and review of laboratory studies, ordering and review of radiographic studies, re-evaluation of patient's condition and pulse oximetry.        ED Vital Signs:  Vitals:    04/11/20 1909 04/11/20 1923 04/11/20 1930 04/11/20 1955   BP: (!) 209/86  (!) 210/85 (!) 238/98   Pulse: 69 66 65 62   Resp: 18  18 20   Temp: 97.8 °F (36.6 °C)      TempSrc: Oral      SpO2: 98%  100% 99%   Weight:       Height: 5' 5" (1.651 m)       04/11/20 2000 04/11/20 2100 04/11/20 2132 04/11/20 2203   BP: (!) 228/96 (!) 232/98 (!) 237/98 (!) 233/97   Pulse: 62 66 60 62   Resp: 17 20 20 18   Temp:       TempSrc:       SpO2: 98% 98% 98% 98%   Weight:       Height:        04/11/20 2232 04/11/20 2303 04/11/20 2308 04/11/20 2332   BP: (!) " 202/83 (!) 201/84  (!) 200/87   Pulse: 62 61  62   Resp: (!) 21 20 19   Temp:       TempSrc:       SpO2: 98% 99%  98%   Weight:   85.5 kg (188 lb 7.9 oz)    Height:        04/11/20 2350 04/12/20 0003 04/12/20 0012   BP: (!) 192/81 (!) 179/75 (!) 187/79   Pulse: 62 62 64   Resp: 16 20 20   Temp:   97.8 °F (36.6 °C)   TempSrc:   Oral   SpO2: 99% 98% 98%   Weight:      Height:          Abnormal Lab Results:  Labs Reviewed   CBC W/ AUTO DIFFERENTIAL - Abnormal; Notable for the following components:       Result Value    Mean Corpuscular Hemoglobin Conc 31.0 (*)     All other components within normal limits   COMPREHENSIVE METABOLIC PANEL - Abnormal; Notable for the following components:    Glucose 359 (*)     Creatinine 2.0 (*)     Calcium 14.1 (*)     Alkaline Phosphatase 259 (*)     AST 96 (*)     ALT 71 (*)     eGFR if  26.6 (*)     eGFR if non  23.1 (*)     All other components within normal limits   B-TYPE NATRIURETIC PEPTIDE - Abnormal; Notable for the following components:     (*)     All other components within normal limits   LACTIC ACID, PLASMA - Abnormal; Notable for the following components:    Lactate (Lactic Acid) 2.3 (*)     All other components within normal limits   URINALYSIS, REFLEX TO URINE CULTURE - Abnormal; Notable for the following components:    Protein, UA 1+ (*)     Glucose, UA 1+ (*)     Occult Blood UA Trace (*)     Nitrite, UA Positive (*)     All other components within normal limits    Narrative:     Preferred Collection Type->Urine, Clean Catch   LACTIC ACID, PLASMA - Abnormal; Notable for the following components:    Lactate (Lactic Acid) 2.3 (*)     All other components within normal limits   URINALYSIS MICROSCOPIC - Abnormal; Notable for the following components:    WBC, UA 35 (*)     WBC Clumps, UA Few (*)     Bacteria Moderate (*)     Yeast, UA Occasional (*)     Hyaline Casts, UA 22 (*)     All other components within normal limits     Narrative:     Preferred Collection Type->Urine, Clean Catch   POCT GLUCOSE - Abnormal; Notable for the following components:    POCT Glucose 351 (*)     All other components within normal limits   INFLUENZA A & B BY MOLECULAR   CULTURE, BLOOD   CULTURE, BLOOD   CULTURE, URINE   TROPONIN I   LIPASE   C-REACTIVE PROTEIN   LACTATE DEHYDROGENASE   CK   SARS-COV-2 RNA AMPLIFICATION, QUAL    Narrative:     What symptom criteria does the patient meet?->Other (specify)  Please specify:->AMS   PROTIME-INR   GAMMA GT   PTH, INTACT   VITAMIN D   FERRITIN   PROTIME-INR   VITAMIN D   GAMMA GT   LACTIC ACID, PLASMA        All Lab Results:  Results for orders placed or performed during the hospital encounter of 04/11/20   Influenza A & B by Molecular   Result Value Ref Range    Influenza A, Molecular Negative Negative    Influenza B, Molecular Negative Negative    Flu A & B Source Nasal swab    CBC auto differential   Result Value Ref Range    WBC 6.31 3.90 - 12.70 K/uL    RBC 4.47 4.00 - 5.40 M/uL    Hemoglobin 12.6 12.0 - 16.0 g/dL    Hematocrit 40.7 37.0 - 48.5 %    Mean Corpuscular Volume 91 82 - 98 fL    Mean Corpuscular Hemoglobin 28.2 27.0 - 31.0 pg    Mean Corpuscular Hemoglobin Conc 31.0 (L) 32.0 - 36.0 g/dL    RDW 14.5 11.5 - 14.5 %    Platelets 275 150 - 350 K/uL    MPV 11.0 9.2 - 12.9 fL    Immature Granulocytes 0.3 0.0 - 0.5 %    Gran # (ANC) 4.3 1.8 - 7.7 K/uL    Immature Grans (Abs) 0.02 0.00 - 0.04 K/uL    Lymph # 1.2 1.0 - 4.8 K/uL    Mono # 0.7 0.3 - 1.0 K/uL    Eos # 0.1 0.0 - 0.5 K/uL    Baso # 0.01 0.00 - 0.20 K/uL    nRBC 0 0 /100 WBC    Gran% 67.6 38.0 - 73.0 %    Lymph% 19.5 18.0 - 48.0 %    Mono% 11.3 4.0 - 15.0 %    Eosinophil% 1.1 0.0 - 8.0 %    Basophil% 0.2 0.0 - 1.9 %    Differential Method Automated    Comprehensive metabolic panel   Result Value Ref Range    Sodium 138 136 - 145 mmol/L    Potassium 4.5 3.5 - 5.1 mmol/L    Chloride 103 95 - 110 mmol/L    CO2 24 23 - 29 mmol/L    Glucose 359 (H) 70 -  110 mg/dL    BUN, Bld 23 8 - 23 mg/dL    Creatinine 2.0 (H) 0.5 - 1.4 mg/dL    Calcium 14.1 (HH) 8.7 - 10.5 mg/dL    Total Protein 7.8 6.0 - 8.4 g/dL    Albumin 3.7 3.5 - 5.2 g/dL    Total Bilirubin 0.6 0.1 - 1.0 mg/dL    Alkaline Phosphatase 259 (H) 55 - 135 U/L    AST 96 (H) 10 - 40 U/L    ALT 71 (H) 10 - 44 U/L    Anion Gap 11 8 - 16 mmol/L    eGFR if African American 26.6 (A) >60 mL/min/1.73 m^2    eGFR if non  23.1 (A) >60 mL/min/1.73 m^2   Troponin I   Result Value Ref Range    Troponin I 0.009 0.000 - 0.026 ng/mL   Brain natriuretic peptide   Result Value Ref Range     (H) 0 - 99 pg/mL   Lactic acid, plasma   Result Value Ref Range    Lactate (Lactic Acid) 2.3 (H) 0.5 - 2.2 mmol/L   Lipase   Result Value Ref Range    Lipase 38 4 - 60 U/L   Urinalysis, Reflex to Urine Culture Urine, Clean Catch   Result Value Ref Range    Specimen UA Urine, Catheterized     Color, UA Yellow Yellow, Straw, Aaliyah    Appearance, UA Clear Clear    pH, UA 5.0 5.0 - 8.0    Specific Gravity, UA 1.025 1.005 - 1.030    Protein, UA 1+ (A) Negative    Glucose, UA 1+ (A) Negative    Ketones, UA Negative Negative    Bilirubin (UA) Negative Negative    Occult Blood UA Trace (A) Negative    Nitrite, UA Positive (A) Negative    Urobilinogen, UA 1.0 <2.0 EU/dL    Leukocytes, UA Negative Negative   C-Reactive Protein   Result Value Ref Range    CRP 7.6 0.0 - 8.2 mg/L   Lactate Dehydrogenase   Result Value Ref Range     110 - 260 U/L   CK   Result Value Ref Range    CPK 59 20 - 180 U/L   Lactic Acid, Plasma   Result Value Ref Range    Lactate (Lactic Acid) 2.3 (H) 0.5 - 2.2 mmol/L   COVID-19 Routine Screening   Result Value Ref Range    SARS-CoV-2 RNA, Amplification, Qual Negative Negative   Urinalysis Microscopic   Result Value Ref Range    RBC, UA 0 0 - 4 /hpf    WBC, UA 35 (H) 0 - 5 /hpf    WBC Clumps, UA Few (A) None-Rare    Bacteria Moderate (A) None-Occ /hpf    Yeast, UA Occasional (A) None    Squam Epithel, UA  5 /hpf    Hyaline Casts, UA 22 (A) 0-1/lpf /lpf    Microscopic Comment SEE COMMENT    POCT glucose   Result Value Ref Range    POCT Glucose 351 (H) 70 - 110 mg/dL     Imaging Results:  Imaging Results          CT Head Without Contrast (Final result)  Result time 04/11/20 20:49:18    Final result by Calvin Seth MD (04/11/20 20:49:18)                 Impression:      No CT evidence of an acute intracranial process.  Age-appropriate cerebral atrophy with prominent symmetric bifrontal extra-axial fluid.    All CT scans at this facility are performed  using dose modulation techniques as appropriate to performed exam including the following:  automated exposure control; adjustment of mA and/or kV according to the patients size (this includes techniques or standardized protocols for targeted exams where dose is matched to indication/reason for exam: i.e. extremities or head);  iterative reconstruction technique.      Electronically signed by: Calvin Seth MD  Date:    04/11/2020  Time:    20:49             Narrative:    EXAMINATION:  CT HEAD WITHOUT CONTRAST    CLINICAL HISTORY:  Confusion/delirium, altered LOC, unexplained;    TECHNIQUE:  Routine noncontrast head CT.    COMPARISON:  None    FINDINGS:  There is no acute intracranial hemorrhage or abnormal extra-axial fluid collection.  There is advanced cerebral atrophy with bilateral symmetric bifrontal extra-axial fluid.  Ventricular-sulcal congruence.  There is no abnormal increased or decreased density within the brain parenchyma.  Gray-white differentiation preserved.  There is no intracranial mass or mass effect.  The calvarium is intact.  Visualized paranasal sinuses and mastoids are well aerated.                               X-Ray Chest AP Portable (Final result)  Result time 04/11/20 20:24:18    Final result by Calvin Seth MD (04/11/20 20:24:18)                 Impression:      No acute process.      Electronically signed by: Calvin Seth  MD  Date:    04/11/2020  Time:    20:24             Narrative:    EXAMINATION:  XR CHEST AP PORTABLE    CLINICAL HISTORY:  Transient alteration of awareness, unspecified    FINDINGS:  No prior study.  Normal size heart.  Aortic arch calcification.  No congestion.  Lungs are clear.  Multilevel anterior cervical fusion.                                 The EKG was ordered, reviewed, and independently interpreted by the ED provider.  Rate: 65 BPM  Rhythm: normal sinus rhythm  Interpretation: RBBB. No STEMI. MN, QTC WNL. No previous for comparison.           The Emergency Provider reviewed the vital signs and test results, which are outlined above.     ED Discussion     11:16PM: Discussed pt's case with Clinton Mark MD (Hospitalist) who recommends IV fluid and calcitonin. Normal saline rather than LR which has some calcium in it. Draw a vitamin D and intact PTH. Algo get a GGT. Her alk phos is up along with AST/ALT which suggest liver.     11:22 PM: Discussed case with Dr. Mark (Hospital Medicine). Dr. Jewell agrees with current care and management of pt and accepts admission.   Admitting Service: Hospital Medicine   Admitting Physician: Dr. Jewell   Admit to: ICU    11:22 PM: Re-evaluated pt. I have discussed test results, shared treatment plan, and the need for admission with patient and family at bedside. Pt and family express understanding at this time and agree with all information. All questions answered. Pt and family have no further questions or concerns at this time. Pt is ready for admit.    ED Course as of Apr 12 0054   Sat Apr 11, 2020   2221 Calcium(!!): 14.1 [BA]   2221 Creatinine(!): 2.0 [BA]      ED Course User Index  [BA] Toni Belcher MD     Medical Decision Making:   Clinical Tests:   Lab Tests: Reviewed and Ordered  Radiological Study: Reviewed and Ordered  Medical Tests: Reviewed and Ordered           ED Medication(s):  Medications   niCARdipine 40 mg/200 mL infusion (5 mg/hr  Intravenous New Bag 4/11/20 2340)   cefTRIAXone (ROCEPHIN) 2 g in dextrose 5 % 50 mL IVPB (0 g Intravenous Stopped 4/11/20 2115)   hydrALAZINE tablet 25 mg (25 mg Oral Given 4/11/20 2045)   hydrALAZINE tablet 25 mg (25 mg Oral Given 4/11/20 2230)   hydrALAZINE injection 10 mg (10 mg Intravenous Given 4/11/20 2245)   calcitonin injection 342 Units (342 Units Subcutaneous Given 4/12/20 0007)   0.9%  NaCl infusion (1,000 mLs Intravenous New Bag 4/11/20 2340)       New Prescriptions    No medications on file               Scribe Attestation:   Scribe #1: I performed the above scribed service and the documentation accurately describes the services I performed. I attest to the accuracy of the note.     Attending:   Physician Attestation Statement for Scribe #1: I, Toni Belcher MD, personally performed the services described in this documentation, as scribed by Kristy Gleason, in my presence, and it is both accurate and complete.           Clinical Impression       ICD-10-CM ICD-9-CM   1. Acute cystitis without hematuria N30.00 595.0   2. Encephalopathy G93.40 348.30   3. Hypertension, unspecified type I10 401.9   4. Hypercalcemia E83.52 275.42       Disposition:   Disposition: Admitted  Condition: Critical       Toni Belcher MD  04/12/20 0054

## 2020-04-12 NOTE — ASSESSMENT & PLAN NOTE
Restart home medication - Metoprolol, Doxazosin, Hydralazine, and Nifedipine XR.  Nicardipine infusion to keep SBP below 170.

## 2020-04-12 NOTE — SIGNIFICANT EVENT
Spoke with patient's granddaughter Amisha who is present at [facility].  Updated on US results; all questions answered.  Patient is on Eliquis, which has been discontinued (last dose taken this morning).  D/W Dr. Pink given need for liver biopsy, she will d/w IR.

## 2020-04-12 NOTE — ASSESSMENT & PLAN NOTE
Careful fluid management.  Continuing diuretic with IV fluid for hypercalcemia.  Continuing Metoprolol.

## 2020-04-12 NOTE — SIGNIFICANT EVENT
U/S abd report is reviewed . Noted numerous hepatic masses likely metastatic disease .     Plan-     1. Spoke with Dr. Bledsoe if he would recommend Bisphosphonate or Prolia for hypercalcemia of malignancy.    2. IR consult for percutaneous liver biopsy     3. Get CEA, CA 19-9, AFP ,

## 2020-04-12 NOTE — H&P
[facility] -   Critical Care Medicine  History & Physical    Patient Name: Veronica Sue  MRN: [id number]  Admission Date: 4/11/2020  Hospital Length of Stay: 1 days  Code Status: Full Code  Attending Physician: No att. providers found   Primary Care Provider: Primary Doctor No   Principal Problem: Encephalopathy, metabolic    Subjective:     HPI:  Brought in to the Emergency Department because of weakness.  Fell several times at home.  Weak and unable to keep balance.  Getting worse over the last week.  Also having general body aches and started nausea and vomiting tonight.  No problems urinating but is having bad constipation for some time.  No cough, shortness of breath, or chest pain.  Denies fevers or chills.  No known recent sick contacts.  No medicaiton changes.    Hospital/ICU Course:  No notes on file     Past Medical History:   Diagnosis Date    Anxiety     Atrial fibrillation     Cerebellar stroke     CKD (chronic kidney disease) stage 4, GFR 15-29 ml/min     Coronary artery disease     Diabetes     Diastolic heart failure     DVT, lower extremity, left peroneal vein     GERD (gastroesophageal reflux disease)        Past Surgical History:   Procedure Laterality Date    APPENDECTOMY      BREAST BIOPSY      CARDIAC CATHETERIZATION      HYSTERECTOMY      TONSILLECTOMY         Review of patient's allergies indicates:   Allergen Reactions    Iodinated contrast media Swelling     Taken amiodorone    Levofloxacin     Minoxidil     Rosiglitazone        Family History     Problem Relation (Age of Onset)    Heart disease Mother, Father, Sister, Brother    Stroke Sister        Tobacco Use    Smoking status: Never Smoker   Substance and Sexual Activity    Alcohol use: Not Currently    Drug use: Not on file    Sexual activity: Not on file         Review of Systems   Constitutional: Negative for chills and fever.   HENT: Negative for congestion and sore throat.    Eyes: Negative for  visual disturbance.   Respiratory: Negative for cough, shortness of breath and wheezing.    Cardiovascular: Negative for chest pain, palpitations and leg swelling.   Gastrointestinal: Positive for nausea and vomiting. Negative for abdominal pain, blood in stool, constipation and diarrhea.   Genitourinary: Negative for dysuria and hematuria.   Musculoskeletal: Positive for arthralgias, gait problem and myalgias. Negative for back pain.   Skin: Negative for rash and wound.   Neurological: Negative for dizziness, weakness, light-headedness and numbness.   Hematological: Negative for adenopathy.     Objective:     Vital Signs (Most Recent):  Temp: 97.8 °F (36.6 °C) (04/12/20 0012)  Pulse: 75 (04/12/20 0330)  Resp: 18 (04/12/20 0330)  BP: (!) 194/81 (04/12/20 0330)  SpO2: 99 % (04/12/20 0330) Vital Signs (24h Range):  Temp:  [97.8 °F (36.6 °C)] 97.8 °F (36.6 °C)  Pulse:  [60-91] 75  Resp:  [16-21] 18  SpO2:  [97 %-100 %] 99 %  BP: (173-238)/(74-98) 194/81     Weight: 85.5 kg (188 lb 7.9 oz)  Body mass index is 31.37 kg/m².      Intake/Output Summary (Last 24 hours) at 4/12/2020 0334  Last data filed at 4/12/2020 0200  Gross per 24 hour   Intake 1341.67 ml   Output 0 ml   Net 1341.67 ml       Physical Exam   Constitutional: She is oriented to person, place, and time. She appears well-developed and well-nourished. No distress.   HENT:   Head: Normocephalic and atraumatic.   Mouth/Throat: Oropharynx is clear and moist.   Eyes: Pupils are equal, round, and reactive to light. Conjunctivae and EOM are normal.   Neck: Neck supple. No JVD present. No thyromegaly present.   Cardiovascular: Normal rate and regular rhythm. Exam reveals no gallop and no friction rub.   No murmur heard.  Pulmonary/Chest: Effort normal and breath sounds normal. She has no wheezes. She has no rales.   Abdominal: Soft. Bowel sounds are normal. She exhibits no distension. There is tenderness. There is no rebound and no guarding.   Musculoskeletal: Normal  range of motion. She exhibits no edema or deformity.   Lymphadenopathy:     She has no cervical adenopathy.   Neurological: She is alert and oriented to person, place, and time. She has normal reflexes.   Very sluggish to respond to questions but answers appropriately and aware of situation.  Reports paresthesias on palpation of toes and feet.   Skin: Skin is warm and dry. No rash noted.   Psychiatric: She has a normal mood and affect. Her behavior is normal. Judgment and thought content normal.   Nursing note and vitals reviewed.      Vents:       Lines/Drains/Airways     Peripheral Intravenous Line                 Peripheral IV - Single Lumen 04/11/20 1956 20 G Left Forearm less than 1 day                Significant Labs:    CBC/Anemia Profile:  Recent Labs   Lab 04/1940   WBC 6.31   HGB 12.6   HCT 40.7      MCV 91   RDW 14.5   FERRITIN 68        Chemistries:  Recent Labs   Lab 04/1940      K 4.5      CO2 24   BUN 23   CREATININE 2.0*   CALCIUM 14.1*   ALBUMIN 3.7   PROT 7.8   BILITOT 0.6   ALKPHOS 259*   ALT 71*   AST 96*       All pertinent labs within the past 24 hours have been reviewed.    Significant Imaging:   I have reviewed all pertinent imaging results/findings within the past 24 hours.    Assessment/Plan:     Neuro  * Encephalopathy, metabolic  Multifactorial due to Hypercalcemia, Severe Hypertension, and UTI.  Correcting each problem separately.  Neuro checks every 4 hours.    Cardiac/Vascular  Atrial fibrillation  Continuing Amiodarone and Apixaban.    Diastolic heart failure  Careful fluid management.  Continuing diuretic with IV fluid for hypercalcemia.  Continuing Metoprolol.    Accelerated hypertension  Restart home medication - Metoprolol, Doxazosin, Hydralazine, and Nifedipine XR.  Nicardipine infusion to keep SBP below 170.    Renal/  CKD (chronic kidney disease) stage 4, GFR 15-29 ml/min  Renal function chemistries at baseline on admission.  Creatinine  between 2 and 2.3 over the last 12 months.  Monitor urine output.  Volume expansion with normal saline and BP control.  Serial renal function chemistries.    Acute cystitis without hematuria  Urinalysis with a strong pattern of cystitis.  She has a history of P.mirabilis UTI sensitive to Ceftriaxone.  Urine sent for culture and empirically started Cefriaxone.  No fever, no tachycardia, WBC normal, and no dysuria.    Hypercalcemia  14.3 corrected for Albumin of 3.7.  Severe hypercalcemia of unknown etiology.  Start with volume expansion using normal saline and Calcitonin.  Per medical records her serum Calcium levels have been between 8 and 9 until now.  Draw Vitamin D and intact PTH levels.  She normally takes 80 mg tablet once a day.  Strict I&O and serial chemistries.  CXR is clear of any mass.  She had a BiRads 2 negative Mammogram last year.  Alkaline phosphatase is elevated with mild elevation of AST/ALT.  Check GGT.  Normal Saline at 200 mL/hr and Furosemide 60 mg IV every 12 hours for 3 doses.  She received one dose Calcitonin in the ED - 342 Units SC at admission.    Hematology  DVT, lower extremity  Continue Apixaban    Endocrine  Diabetes  Accuchecks and low dose SSI.  Recheck Hgb A1c.        Critical Care Daily Checklist:    A: Awake: RASS Goal/Actual Goal:    Actual:     B: Spontaneous Breathing Trial Performed?     C: SAT & SBT Coordinated?  N/A                      D: Delirium: CAM-ICU     E: Early Mobility Performed? Yes   F: Feeding Goal:    Status:     Current Diet Order   Procedures    Diet Cardiac      AS: Analgesia/Sedation N/A   T: Thromboembolic Prophylaxis Apixaban   H: HOB > 300 Yes   U: Stress Ulcer Prophylaxis (if needed) Pantoprazole   G: Glucose Control Accuchecks and low dose SSI   B: Bowel Function     I: Indwelling Catheter (Lines & Arguello) Necessity Yes   D: De-escalation of Antimicrobials/Pharmacotherapies Ceftriaxone and urine for culture    Plan for the day/ETD Yes    Code  Status:  Family/Goals of Care: Full Code  Yes     Critical Care Time: 60 minutes  Critical secondary to Patient has a condition that poses threat to life and bodily function: Severe hypertension and hypercalcemia     Critical care was time spent personally by me on the following activities: development of treatment plan with patient or surrogate and bedside caregivers, discussions with consultants, evaluation of patient's response to treatment, examination of patient, ordering and performing treatments and interventions, ordering and review of laboratory studies, ordering and review of radiographic studies, pulse oximetry, re-evaluation of patient's condition. This critical care time did not overlap with that of any other provider or involve time for any procedures.     Clinton Mark MD  Critical Care Medicine  [facility] -

## 2020-04-12 NOTE — ED NOTES
Secure chat sent to [facility]. Pt reports nausea. Requesting Zofran. Waiting for new orders

## 2020-04-12 NOTE — HOSPITAL COURSE
On 4/11 patient was admitted to the ICU secondary to HTN emergency requiring a Cardene gtt, AMS thought to be r/t new onset hypercalcemia, UTI, and severe weakness with h/o multiple falls recently.  BP on arrival was in the 200/90 range.  Ca noted to be 14.1 (per care everywhere is was 9.4 in November of 2019).  Per her grand-daughter Amisha who works here as an RN, patient has no known h/o cancer.  Patient was given IVFs in the ER and started on Lasix IVP.  She also received Calcitonin x 1 dose.  CT head showed no CT evidence of an acute intracranial process.  Age-appropriate cerebral atrophy with prominent symmetric bifrontal extra-axial fluid.      As of 4/12 Cardene gtt has been weaned off and her home BP medications have been resumed.  Repeat Ca today 13.3.  GGT elevated to 430 with mild elevation in LFTs.  PTH related peptide and SPEP pending.  Given new onset hypercalcemia, there is high suspicion for paraneoplastic syndrome/malignancy.  Abdominal US pending to evaluate liver.  Will hold off on CT for now and re-assess renal function in the AM as patient will require contrast.  At baseline, patient has CKD stage IV.  Nephrology consulted to assist with hypercalcemia.  PT/OT consulted.  BC show NGTD and patient remains afebrile.  UC pending; will continue Rocephin for now and adjust ABX based on final cx.

## 2020-04-12 NOTE — ASSESSMENT & PLAN NOTE
Multifactorial due to Hypercalcemia, Severe Hypertension, and UTI.  Correcting each problem separately.  Neuro checks every 4 hours.

## 2020-04-12 NOTE — ED NOTES
Pt sitting up in bed in NAD eating toast and drinking juice, VSS, RR equal and unlabored. Bed is low, locked, and call light in reach. Side rails up x 2. Pt has no further complaints, will continue to monitor.

## 2020-04-12 NOTE — SUBJECTIVE & OBJECTIVE
Past Medical History:   Diagnosis Date    Anticoagulant long-term use     Anxiety     Arthritis     Atrial fibrillation     CHF (congestive heart failure)     CKD (chronic kidney disease) stage 4, GFR 15-29 ml/min     Coronary artery disease     Diabetes     Diastolic heart failure     DVT, lower extremity, left peroneal vein     GERD (gastroesophageal reflux disease)     Hypertension        Past Surgical History:   Procedure Laterality Date    APPENDECTOMY      BREAST BIOPSY      CARDIAC CATHETERIZATION      HYSTERECTOMY      TONSILLECTOMY         Review of patient's allergies indicates:   Allergen Reactions    Iodinated contrast media Swelling     Taken amiodorone    Levofloxacin     Minoxidil     Rosiglitazone      Current Facility-Administered Medications   Medication Frequency    0.9%  NaCl infusion Continuous    ALPRAZolam tablet 0.5 mg BID    amiodarone tablet 200 mg Daily    apixaban tablet 2.5 mg BID    cefTRIAXone (ROCEPHIN) 1 g in dextrose 5 % 50 mL IVPB Q24H    dextrose 10% (D10W) Bolus PRN    dextrose 10% (D10W) Bolus PRN    doxazosin tablet 4 mg Daily    furosemide injection 60 mg Q12H    furosemide injection 60 mg Once    [START ON 4/13/2020] furosemide tablet 80 mg Daily    glucagon (human recombinant) injection 1 mg PRN    glucose chewable tablet 16 g PRN    glucose chewable tablet 24 g PRN    hydrALAZINE tablet 50 mg BID    insulin aspart U-100 pen 0-5 Units QID (AC + HS) PRN    levothyroxine tablet 88 mcg Before breakfast    metoprolol succinate (TOPROL-XL) 24 hr tablet 25 mg Daily    niCARdipine 40 mg/200 mL infusion Continuous    NIFEdipine 24 hr tablet 90 mg Daily    ondansetron disintegrating tablet 4 mg Q6H PRN    ondansetron injection 4 mg Q8H PRN    pantoprazole EC tablet 40 mg Daily    simvastatin tablet 40 mg QHS    sodium chloride 0.9% flush 10 mL PRN     Current Outpatient Medications   Medication    ALPRAZolam (XANAX) 0.5 MG tablet     amiodarone (PACERONE) 200 MG Tab    benzonatate (TESSALON) 100 MG capsule    calcitRIOL (ROCALTROL) 0.25 MCG Cap    doxazosin (CARDURA) 4 MG tablet    ELIQUIS 2.5 mg Tab    furosemide (LASIX) 40 MG tablet    glimepiride (AMARYL) 2 MG tablet    hydrALAZINE (APRESOLINE) 50 MG tablet    HYDROcodone-acetaminophen (NORCO) 5-325 mg per tablet    insulin  unit/mL injection    levothyroxine (SYNTHROID) 88 MCG tablet    metOLazone (ZAROXOLYN) 5 MG tablet    metoprolol succinate (TOPROL-XL) 25 MG 24 hr tablet    NIFEdipine (PROCARDIA-XL) 90 MG (OSM) 24 hr tablet    pantoprazole (PROTONIX) 40 MG tablet    potassium chloride SA (K-DUR,KLOR-CON) 20 MEQ tablet    simvastatin (ZOCOR) 40 MG tablet    tiZANidine (ZANAFLEX) 4 MG tablet    traMADoL (ULTRAM) 50 mg tablet     Family History     Problem Relation (Age of Onset)    Heart disease Mother, Father, Sister, Brother    Stroke Sister        Tobacco Use    Smoking status: Never Smoker   Substance and Sexual Activity    Alcohol use: Not Currently    Drug use: Not on file    Sexual activity: Not on file     Review of Systems   Constitutional: Negative for fatigue and fever.   HENT: Negative for congestion.    Eyes: Negative for visual disturbance.   Respiratory: Negative for cough, shortness of breath and wheezing.    Cardiovascular: Negative for chest pain and palpitations.   Gastrointestinal: Positive for abdominal pain, constipation, nausea and vomiting. Negative for diarrhea.   Genitourinary: Positive for dysuria. Negative for difficulty urinating.   Musculoskeletal: Negative for joint swelling.   Skin: Negative for rash.   Neurological: Negative for weakness and headaches.     Objective:     Vital Signs (Most Recent):  Temp: 97.8 °F (36.6 °C) (04/12/20 0500)  Pulse: 68 (04/12/20 1115)  Resp: (!) 25 (04/12/20 1115)  BP: (!) 145/67 (04/12/20 1115)  SpO2: 97 % (04/12/20 1115)  O2 Device (Oxygen Therapy): room air (04/12/20 0120) Vital Signs (24h  Range):  Temp:  [97.8 °F (36.6 °C)] 97.8 °F (36.6 °C)  Pulse:  [60-91] 68  Resp:  [16-25] 25  SpO2:  [97 %-100 %] 97 %  BP: (125-238)/(64-98) 145/67     Weight: 85.5 kg (188 lb 7.9 oz) (04/11/20 2308)  Body mass index is 31.37 kg/m².  Body surface area is 1.98 meters squared.    I/O last 3 completed shifts:  In: 2341.7 [I.V.:1291.7; IV Piggyback:1050]  Out: 450 [Urine:450]    Physical Exam   Constitutional: She appears well-developed and well-nourished.   HENT:   Head: Normocephalic.   Eyes: Pupils are equal, round, and reactive to light.   Neck: No thyromegaly present.   Cardiovascular: Normal rate and regular rhythm. Exam reveals no friction rub.   Pulmonary/Chest: Effort normal and breath sounds normal. She has no wheezes. She exhibits no tenderness.   Abdominal: Soft. Bowel sounds are normal. She exhibits no distension. There is no tenderness.   Musculoskeletal: She exhibits no edema.   Lymphadenopathy:     She has no cervical adenopathy.   Neurological: She is alert.   Skin: Skin is warm and dry. No rash noted.       Significant Labs:  Lab Results   Component Value Date    CREATININE 1.5 (H) 04/12/2020    CREATININE 1.5 (H) 04/12/2020    BUN 20 04/12/2020    BUN 20 04/12/2020     04/12/2020     04/12/2020    K 3.9 04/12/2020    K 3.9 04/12/2020     04/12/2020     04/12/2020    CO2 19 (L) 04/12/2020    CO2 19 (L) 04/12/2020     Lab Results   Component Value Date    PTH 5.9 (L) 04/12/2020    CALCIUM 13.3 (HH) 04/12/2020    CALCIUM 13.3 (HH) 04/12/2020    PHOS 1.5 (L) 04/12/2020     Lab Results   Component Value Date    ALBUMIN 3.4 (L) 04/12/2020    ALBUMIN 3.4 (L) 04/12/2020     Lab Results   Component Value Date    WBC 9.46 04/12/2020    HGB 11.9 (L) 04/12/2020    HCT 38.1 04/12/2020    MCV 91 04/12/2020     04/12/2020       Recent Labs   Lab 04/12/20  0430   MG 1.5*     25-OH vitamin D: 32 (4/12/20)    PTHrp: pending    SPEP: pending        Significant Imaging:  Imaging Results           CT Head Without Contrast (Final result)  Result time 04/11/20 20:49:18    Final result by Calvin Seth MD (04/11/20 20:49:18)                 Impression:      No CT evidence of an acute intracranial process.  Age-appropriate cerebral atrophy with prominent symmetric bifrontal extra-axial fluid.    All CT scans at this facility are performed  using dose modulation techniques as appropriate to performed exam including the following:  automated exposure control; adjustment of mA and/or kV according to the patients size (this includes techniques or standardized protocols for targeted exams where dose is matched to indication/reason for exam: i.e. extremities or head);  iterative reconstruction technique.      Electronically signed by: Calvin Seth MD  Date:    04/11/2020  Time:    20:49             Narrative:    EXAMINATION:  CT HEAD WITHOUT CONTRAST    CLINICAL HISTORY:  Confusion/delirium, altered LOC, unexplained;    TECHNIQUE:  Routine noncontrast head CT.    COMPARISON:  None    FINDINGS:  There is no acute intracranial hemorrhage or abnormal extra-axial fluid collection.  There is advanced cerebral atrophy with bilateral symmetric bifrontal extra-axial fluid.  Ventricular-sulcal congruence.  There is no abnormal increased or decreased density within the brain parenchyma.  Gray-white differentiation preserved.  There is no intracranial mass or mass effect.  The calvarium is intact.  Visualized paranasal sinuses and mastoids are well aerated.                               X-Ray Chest AP Portable (Final result)  Result time 04/11/20 20:24:18    Final result by Calvin Seth MD (04/11/20 20:24:18)                 Impression:      No acute process.      Electronically signed by: Calvin Seth MD  Date:    04/11/2020  Time:    20:24             Narrative:    EXAMINATION:  XR CHEST AP PORTABLE    CLINICAL HISTORY:  Transient alteration of awareness, unspecified    FINDINGS:  No prior study.  Normal size  heart.  Aortic arch calcification.  No congestion.  Lungs are clear.  Multilevel anterior cervical fusion.

## 2020-04-12 NOTE — PT/OT/SLP PROGRESS
Physical Therapy      Patient Name:  Veronica Sue   MRN:  [id number]    Eval initiated via chart review in Epic and discussion with nurse. Patient not seen today secondary to RN stated to start tomorrow. Will follow-up on next session.    Calvin Marshall, PT

## 2020-04-12 NOTE — ASSESSMENT & PLAN NOTE
Urinalysis with a strong pattern of cystitis.  She has a history of P.mirabilis UTI sensitive to Ceftriaxone.  Urine sent for culture and empirically started Cefriaxone.  No fever, no tachycardia, WBC normal, and no dysuria.

## 2020-04-12 NOTE — SIGNIFICANT EVENT
On 4/11 patient was admitted to the ICU secondary to HTN emergency requiring a Cardene gtt, AMS thought to be r/t new onset hypercalcemia, UTI, and severe weakness with h/o multiple falls recently.  BP on arrival was in the 200/90 range.  Ca noted to be 14.1 (per care everywhere is was 9.4 in November of 2019).  Per her grand-daughter Amisha who works here as an RN, patient has no known h/o cancer.  Patient was given IVFs in the ER and started on Lasix IVP.  She also received Calcitonin x 1 dose.  CT head showed no CT evidence of an acute intracranial process.  Age-appropriate cerebral atrophy with prominent symmetric bifrontal extra-axial fluid.      As of 4/12 Cardene gtt has been weaned off and her home BP medications have been resumed.  Repeat Ca today 13.3.  GGT elevated to 430 with mild elevation in LFTs.  PTH related peptide and SPEP pending.  Given new onset hypercalcemia, there is high suspicion for paraneoplastic syndrome/malignancy.  Abdominal US pending to evaluate liver.  Will hold off on CT for now and re-assess renal function in the AM as patient will require contrast.  At baseline, patient has CKD stage IV.  Nephrology consulted to assist with hypercalcemia.  PT/OT consulted.  BC show NGTD and patient remains afebrile.  UC pending; will continue Rocephin for now and adjust ABX based on final cx.      Patient seen and examined.  Currently resting comfortably in bed in NAD.  BP improved.  Currently AAOx3, but periods of confusion noted.  Follows simple commands appropriately.  Generalized weakness noted. Grand-daughter Amisha reports multiple recent falls over the past 1 month secondary to increasing weakness.  Agree with continuing current POC. Will transfer patient to Med-surg today; Hospital Medicine contacted to assume care.       POA is her son Rajiv who can be reached at [phone], however, her grand-daughter Amisha (ER nurse here) would like to be contacted on updates so she can relay information  to her uncle.  Amisha can be reached at [phone].  Per Amisha, patient is a Full code.

## 2020-04-12 NOTE — ED NOTES
Recieved report from NAT Brady. Pt lying in bed in NAD, VSS, RR equal and unlabored. Bed is low, locked, and call light in reach. Side rails up x 2. Pt has IV medications infusing at this time. Pt reports comfort, no further needs.

## 2020-04-12 NOTE — ED NOTES
Pt assisted to turn onto Rt side for comfort, pt HOB lowered. Dr. Bledsoe at  assessing pt and discussing POC.

## 2020-04-12 NOTE — HPI
80 year old female with arthritis, atrial fibrillation, CHF, CKD 4, CAD, DM2, LE DVT, HTN [place] [facility] [place], nausea and vomiting, constipation.   Work-up revealed severe hypercalcemia.    Nephrology was consulted to help with patient's renal care while she is [place] [facility]. I saw and examined patient in her hospital room. Patient is complaining of mild abdominal pain, nausea/vomiting, constipation, dysuria. No chest pain, SOB, LE edema, fever.     Chart review revealed that patient suffers drom CKD 4 with baseline creatinine of about 2. Last Ca from 11/23/19 was 9.4.

## 2020-04-12 NOTE — CONSULTS
[facility] -   Nephrology  Consult Note          Patient Name: Veronica Sue  MRN: [id number]  Admission Date: 4/11/2020  Hospital Length of Stay: 1 days  Attending Provider: Claudia Jewell MD   Primary Care Physician: Primary Doctor No  Principal Problem:Encephalopathy, metabolic    Consults  Subjective:     HPI: 80 year old female with arthritis, atrial fibrillation, CHF, CKD 4, CAD, DM2, LE DVT, HTN presents to [facility] [place], [place] with weakness, nausea and vomiting, constipation.   Work-up revealed severe hypercalcemia.    Nephrology was consulted to help with patient's renal care while she is admitted at [facility] [place], [place]. I saw and examined patient in her hospital room. Patient is complaining of mild abdominal pain, nausea/vomiting, constipation, dysuria. No chest pain, SOB, LE edema, fever.     Chart review revealed that patient suffers drom CKD 4 with baseline creatinine of about 2. Last Ca from 11/23/19 was 9.4.     Past Medical History:   Diagnosis Date    Anticoagulant long-term use     Anxiety     Arthritis     Atrial fibrillation     CHF (congestive heart failure)     CKD (chronic kidney disease) stage 4, GFR 15-29 ml/min     Coronary artery disease     Diabetes     Diastolic heart failure     DVT, lower extremity, left peroneal vein     GERD (gastroesophageal reflux disease)     Hypertension        Past Surgical History:   Procedure Laterality Date    APPENDECTOMY      BREAST BIOPSY      CARDIAC CATHETERIZATION      HYSTERECTOMY      TONSILLECTOMY         Review of patient's allergies indicates:   Allergen Reactions    Iodinated contrast media Swelling     Taken amiodorone    Levofloxacin     Minoxidil     Rosiglitazone      Current Facility-Administered Medications   Medication Frequency    0.9%  NaCl infusion Continuous    ALPRAZolam tablet 0.5 mg BID    amiodarone tablet 200 mg Daily    apixaban tablet 2.5 mg BID    cefTRIAXone (ROCEPHIN) 1 g in dextrose 5 % 50 mL IVPB Q24H    dextrose  10% (D10W) Bolus PRN    dextrose 10% (D10W) Bolus PRN    doxazosin tablet 4 mg Daily    furosemide injection 60 mg Q12H    furosemide injection 60 mg Once    [START ON 4/13/2020] furosemide tablet 80 mg Daily    glucagon (human recombinant) injection 1 mg PRN    glucose chewable tablet 16 g PRN    glucose chewable tablet 24 g PRN    hydrALAZINE tablet 50 mg BID    insulin aspart U-100 pen 0-5 Units QID (AC + HS) PRN    levothyroxine tablet 88 mcg Before breakfast    metoprolol succinate (TOPROL-XL) 24 hr tablet 25 mg Daily    niCARdipine 40 mg/200 mL infusion Continuous    NIFEdipine 24 hr tablet 90 mg Daily    ondansetron disintegrating tablet 4 mg Q6H PRN    ondansetron injection 4 mg Q8H PRN    pantoprazole EC tablet 40 mg Daily    simvastatin tablet 40 mg QHS    sodium chloride 0.9% flush 10 mL PRN     Current Outpatient Medications   Medication    ALPRAZolam (XANAX) 0.5 MG tablet    amiodarone (PACERONE) 200 MG Tab    benzonatate (TESSALON) 100 MG capsule    calcitRIOL (ROCALTROL) 0.25 MCG Cap    doxazosin (CARDURA) 4 MG tablet    ELIQUIS 2.5 mg Tab    furosemide (LASIX) 40 MG tablet    glimepiride (AMARYL) 2 MG tablet    hydrALAZINE (APRESOLINE) 50 MG tablet    HYDROcodone-acetaminophen (NORCO) 5-325 mg per tablet    insulin  unit/mL injection    levothyroxine (SYNTHROID) 88 MCG tablet    metOLazone (ZAROXOLYN) 5 MG tablet    metoprolol succinate (TOPROL-XL) 25 MG 24 hr tablet    NIFEdipine (PROCARDIA-XL) 90 MG (OSM) 24 hr tablet    pantoprazole (PROTONIX) 40 MG tablet    potassium chloride SA (K-DUR,KLOR-CON) 20 MEQ tablet    simvastatin (ZOCOR) 40 MG tablet    tiZANidine (ZANAFLEX) 4 MG tablet    traMADoL (ULTRAM) 50 mg tablet     Family History     Problem Relation (Age of Onset)    Heart disease Mother, Father, Sister, Brother    Stroke Sister        Tobacco Use    Smoking status: Never Smoker   Substance and Sexual Activity    Alcohol use: Not Currently     Drug use: Not on file    Sexual activity: Not on file     Review of Systems   Constitutional: Negative for fatigue and fever.   HENT: Negative for congestion.    Eyes: Negative for visual disturbance.   Respiratory: Negative for cough, shortness of breath and wheezing.    Cardiovascular: Negative for chest pain and palpitations.   Gastrointestinal: Positive for abdominal pain, constipation, nausea and vomiting. Negative for diarrhea.   Genitourinary: Positive for dysuria. Negative for difficulty urinating.   Musculoskeletal: Negative for joint swelling.   Skin: Negative for rash.   Neurological: Negative for weakness and headaches.     Objective:     Vital Signs (Most Recent):  Temp: 97.8 °F (36.6 °C) (04/12/20 0500)  Pulse: 68 (04/12/20 1115)  Resp: (!) 25 (04/12/20 1115)  BP: (!) 145/67 (04/12/20 1115)  SpO2: 97 % (04/12/20 1115)  O2 Device (Oxygen Therapy): room air (04/12/20 0120) Vital Signs (24h Range):  Temp:  [97.8 °F (36.6 °C)] 97.8 °F (36.6 °C)  Pulse:  [60-91] 68  Resp:  [16-25] 25  SpO2:  [97 %-100 %] 97 %  BP: (125-238)/(64-98) 145/67     Weight: 85.5 kg (188 lb 7.9 oz) (04/11/20 2308)  Body mass index is 31.37 kg/m².  Body surface area is 1.98 meters squared.    I/O last 3 completed shifts:  In: 2341.7 [I.V.:1291.7; IV Piggyback:1050]  Out: 450 [Urine:450]    Physical Exam   Constitutional: She appears well-developed and well-nourished.   HENT:   Head: Normocephalic.   Eyes: Pupils are equal, round, and reactive to light.   Neck: No thyromegaly present.   Cardiovascular: Normal rate and regular rhythm. Exam reveals no friction rub.   Pulmonary/Chest: Effort normal and breath sounds normal. She has no wheezes. She exhibits no tenderness.   Abdominal: Soft. Bowel sounds are normal. She exhibits no distension. There is no tenderness.   Musculoskeletal: She exhibits no edema.   Lymphadenopathy:     She has no cervical adenopathy.   Neurological: She is alert.   Skin: Skin is warm and dry. No rash noted.        Significant Labs:  Lab Results   Component Value Date    CREATININE 1.5 (H) 04/12/2020    CREATININE 1.5 (H) 04/12/2020    BUN 20 04/12/2020    BUN 20 04/12/2020     04/12/2020     04/12/2020    K 3.9 04/12/2020    K 3.9 04/12/2020     04/12/2020     04/12/2020    CO2 19 (L) 04/12/2020    CO2 19 (L) 04/12/2020     Lab Results   Component Value Date    PTH 5.9 (L) 04/12/2020    CALCIUM 13.3 (HH) 04/12/2020    CALCIUM 13.3 (HH) 04/12/2020    PHOS 1.5 (L) 04/12/2020     Lab Results   Component Value Date    ALBUMIN 3.4 (L) 04/12/2020    ALBUMIN 3.4 (L) 04/12/2020     Lab Results   Component Value Date    WBC 9.46 04/12/2020    HGB 11.9 (L) 04/12/2020    HCT 38.1 04/12/2020    MCV 91 04/12/2020     04/12/2020       Recent Labs   Lab 04/12/20  0430   MG 1.5*     25-OH vitamin D: 32 (4/12/20)    PTHrp: pending    SPEP: pending        Significant Imaging:  Imaging Results          CT Head Without Contrast (Final result)  Result time 04/11/20 20:49:18    Final result by Calvin Seth MD (04/11/20 20:49:18)                 Impression:      No CT evidence of an acute intracranial process.  Age-appropriate cerebral atrophy with prominent symmetric bifrontal extra-axial fluid.    All CT scans at this facility are performed  using dose modulation techniques as appropriate to performed exam including the following:  automated exposure control; adjustment of mA and/or kV according to the patients size (this includes techniques or standardized protocols for targeted exams where dose is matched to indication/reason for exam: i.e. extremities or head);  iterative reconstruction technique.      Electronically signed by: Calvin Seth MD  Date:    04/11/2020  Time:    20:49             Narrative:    EXAMINATION:  CT HEAD WITHOUT CONTRAST    CLINICAL HISTORY:  Confusion/delirium, altered LOC, unexplained;    TECHNIQUE:  Routine noncontrast head CT.    COMPARISON:  None    FINDINGS:  There is no  acute intracranial hemorrhage or abnormal extra-axial fluid collection.  There is advanced cerebral atrophy with bilateral symmetric bifrontal extra-axial fluid.  Ventricular-sulcal congruence.  There is no abnormal increased or decreased density within the brain parenchyma.  Gray-white differentiation preserved.  There is no intracranial mass or mass effect.  The calvarium is intact.  Visualized paranasal sinuses and mastoids are well aerated.                               X-Ray Chest AP Portable (Final result)  Result time 04/11/20 20:24:18    Final result by Calvin Seth MD (04/11/20 20:24:18)                 Impression:      No acute process.      Electronically signed by: Calvin Seth MD  Date:    04/11/2020  Time:    20:24             Narrative:    EXAMINATION:  XR CHEST AP PORTABLE    CLINICAL HISTORY:  Transient alteration of awareness, unspecified    FINDINGS:  No prior study.  Normal size heart.  Aortic arch calcification.  No congestion.  Lungs are clear.  Multilevel anterior cervical fusion.                                  Assessment/Plan:     Disorder of electrolytes  Hypomagnesemia: replete magnesium.    Hypophosphatemia: replete phos.     CKD (chronic kidney disease) stage 4, GFR 15-29 ml/min  Renal function at baseline, monitor.     Diastolic heart failure  Patient on IV fluids, monitor carefully for volume overload.     Accelerated hypertension  On Cardene drip. Will be transferred to ICU for further care.     Hypercalcemia  Patient presents with constipation, abdominal pain, MS change (as per family) which are typical hypercalcemia symptoms.  No clear etiology. PTH appropriately suppressed. SPEP and PTHrp are pending. Vitamin D level not elevated. Will continue IV fluids (100 cc/hr) and IV lasix (60 mg IV bid). Monitor carefully for volume overload. Will follow up on labs.       Acute cystitis without hematuria  Continue antibiotics.         Thank you for your consult. I will follow-up with  patient. Please contact us if you have any additional questions.    Reed Bledsoe MD   Nephrology  [facility] [place]

## 2020-04-13 PROBLEM — K76.9 HEPATIC LESION: Status: ACTIVE | Noted: 2020-04-13

## 2020-04-13 LAB
AFP SERPL-MCNC: ABNORMAL NG/ML (ref 0–8.4)
ALBUMIN SERPL BCP-MCNC: 3.1 G/DL (ref 3.5–5.2)
ALBUMIN SERPL ELPH-MCNC: 3.42 G/DL (ref 3.35–5.55)
ALP SERPL-CCNC: 184 U/L (ref 55–135)
ALPHA1 GLOB SERPL ELPH-MCNC: 0.34 G/DL (ref 0.17–0.41)
ALPHA2 GLOB SERPL ELPH-MCNC: 0.9 G/DL (ref 0.43–0.99)
ALT SERPL W/O P-5'-P-CCNC: 50 U/L (ref 10–44)
ANION GAP SERPL CALC-SCNC: 12 MMOL/L (ref 8–16)
AST SERPL-CCNC: 68 U/L (ref 10–40)
B-GLOBULIN SERPL ELPH-MCNC: 0.83 G/DL (ref 0.5–1.1)
BASOPHILS # BLD AUTO: 0.02 K/UL (ref 0–0.2)
BASOPHILS NFR BLD: 0.3 % (ref 0–1.9)
BILIRUB SERPL-MCNC: 0.5 MG/DL (ref 0.1–1)
BUN SERPL-MCNC: 18 MG/DL (ref 8–23)
CALCIUM SERPL-MCNC: 10.9 MG/DL (ref 8.7–10.5)
CANCER AG125 SERPL-ACNC: 15 U/ML (ref 0–30)
CANCER AG19-9 SERPL-ACNC: 67 U/ML (ref 2–40)
CEA SERPL-MCNC: 1.5 NG/ML (ref 0–5)
CHLORIDE SERPL-SCNC: 109 MMOL/L (ref 95–110)
CO2 SERPL-SCNC: 20 MMOL/L (ref 23–29)
CREAT SERPL-MCNC: 1.4 MG/DL (ref 0.5–1.4)
DIFFERENTIAL METHOD: ABNORMAL
EOSINOPHIL # BLD AUTO: 0.1 K/UL (ref 0–0.5)
EOSINOPHIL NFR BLD: 0.8 % (ref 0–8)
ERYTHROCYTE [DISTWIDTH] IN BLOOD BY AUTOMATED COUNT: 14.6 % (ref 11.5–14.5)
EST. GFR  (AFRICAN AMERICAN): 41 ML/MIN/1.73 M^2
EST. GFR  (NON AFRICAN AMERICAN): 36 ML/MIN/1.73 M^2
GAMMA GLOB SERPL ELPH-MCNC: 1.01 G/DL (ref 0.67–1.58)
GLUCOSE SERPL-MCNC: 253 MG/DL (ref 70–110)
HCT VFR BLD AUTO: 33.5 % (ref 37–48.5)
HGB BLD-MCNC: 10.5 G/DL (ref 12–16)
IMM GRANULOCYTES # BLD AUTO: 0.02 K/UL (ref 0–0.04)
IMM GRANULOCYTES NFR BLD AUTO: 0.3 % (ref 0–0.5)
LYMPHOCYTES # BLD AUTO: 0.7 K/UL (ref 1–4.8)
LYMPHOCYTES NFR BLD: 10.5 % (ref 18–48)
MAGNESIUM SERPL-MCNC: 1.7 MG/DL (ref 1.6–2.6)
MCH RBC QN AUTO: 28.2 PG (ref 27–31)
MCHC RBC AUTO-ENTMCNC: 31.3 G/DL (ref 32–36)
MCV RBC AUTO: 90 FL (ref 82–98)
MONOCYTES # BLD AUTO: 0.6 K/UL (ref 0.3–1)
MONOCYTES NFR BLD: 8.8 % (ref 4–15)
NEUTROPHILS # BLD AUTO: 5.2 K/UL (ref 1.8–7.7)
NEUTROPHILS NFR BLD: 79.3 % (ref 38–73)
NRBC BLD-RTO: 0 /100 WBC
PATHOLOGIST INTERPRETATION SPE: NORMAL
PHOSPHATE SERPL-MCNC: 2.2 MG/DL (ref 2.7–4.5)
PLATELET # BLD AUTO: 87 K/UL (ref 150–350)
PLATELET BLD QL SMEAR: ABNORMAL
PMV BLD AUTO: 12.6 FL (ref 9.2–12.9)
POCT GLUCOSE: 210 MG/DL (ref 70–110)
POCT GLUCOSE: 242 MG/DL (ref 70–110)
POCT GLUCOSE: 345 MG/DL (ref 70–110)
POTASSIUM SERPL-SCNC: 3.8 MMOL/L (ref 3.5–5.1)
PROT SERPL-MCNC: 6.4 G/DL (ref 6–8.4)
PROT SERPL-MCNC: 6.5 G/DL (ref 6–8.4)
RBC # BLD AUTO: 3.73 M/UL (ref 4–5.4)
SODIUM SERPL-SCNC: 141 MMOL/L (ref 136–145)
WBC # BLD AUTO: 6.58 K/UL (ref 3.9–12.7)

## 2020-04-13 PROCEDURE — 97530 THERAPEUTIC ACTIVITIES: CPT

## 2020-04-13 PROCEDURE — 25000003 PHARM REV CODE 250: Performed by: NURSE PRACTITIONER

## 2020-04-13 PROCEDURE — 99223 PR INITIAL HOSPITAL CARE,LEVL III: ICD-10-PCS | Mod: ,,, | Performed by: INTERNAL MEDICINE

## 2020-04-13 PROCEDURE — 25000003 PHARM REV CODE 250: Performed by: INTERNAL MEDICINE

## 2020-04-13 PROCEDURE — 11000001 HC ACUTE MED/SURG PRIVATE ROOM

## 2020-04-13 PROCEDURE — 84100 ASSAY OF PHOSPHORUS: CPT

## 2020-04-13 PROCEDURE — 99233 PR SUBSEQUENT HOSPITAL CARE,LEVL III: ICD-10-PCS | Mod: ,,, | Performed by: INTERNAL MEDICINE

## 2020-04-13 PROCEDURE — 63600175 PHARM REV CODE 636 W HCPCS: Performed by: NURSE PRACTITIONER

## 2020-04-13 PROCEDURE — 87040 BLOOD CULTURE FOR BACTERIA: CPT | Mod: 59

## 2020-04-13 PROCEDURE — 85025 COMPLETE CBC W/AUTO DIFF WBC: CPT

## 2020-04-13 PROCEDURE — 92610 EVALUATE SWALLOWING FUNCTION: CPT

## 2020-04-13 PROCEDURE — 97161 PT EVAL LOW COMPLEX 20 MIN: CPT

## 2020-04-13 PROCEDURE — 97167 OT EVAL HIGH COMPLEX 60 MIN: CPT

## 2020-04-13 PROCEDURE — 99233 SBSQ HOSP IP/OBS HIGH 50: CPT | Mod: ,,, | Performed by: INTERNAL MEDICINE

## 2020-04-13 PROCEDURE — 99223 1ST HOSP IP/OBS HIGH 75: CPT | Mod: ,,, | Performed by: INTERNAL MEDICINE

## 2020-04-13 PROCEDURE — 83735 ASSAY OF MAGNESIUM: CPT

## 2020-04-13 PROCEDURE — 36415 COLL VENOUS BLD VENIPUNCTURE: CPT

## 2020-04-13 PROCEDURE — 63600175 PHARM REV CODE 636 W HCPCS: Performed by: INTERNAL MEDICINE

## 2020-04-13 PROCEDURE — 80053 COMPREHEN METABOLIC PANEL: CPT

## 2020-04-13 RX ORDER — BISACODYL 10 MG
10 SUPPOSITORY, RECTAL RECTAL DAILY PRN
Status: DISCONTINUED | OUTPATIENT
Start: 2020-04-13 | End: 2020-04-15 | Stop reason: HOSPADM

## 2020-04-13 RX ORDER — DEXTROSE MONOHYDRATE 100 MG/ML
25 INJECTION, SOLUTION INTRAVENOUS
Status: DISCONTINUED | OUTPATIENT
Start: 2020-04-13 | End: 2020-04-15 | Stop reason: HOSPADM

## 2020-04-13 RX ORDER — DEXTROSE MONOHYDRATE 100 MG/ML
12.5 INJECTION, SOLUTION INTRAVENOUS
Status: DISCONTINUED | OUTPATIENT
Start: 2020-04-13 | End: 2020-04-15 | Stop reason: HOSPADM

## 2020-04-13 RX ORDER — GLUCAGON 1 MG
1 KIT INJECTION
Status: DISCONTINUED | OUTPATIENT
Start: 2020-04-13 | End: 2020-04-15 | Stop reason: HOSPADM

## 2020-04-13 RX ORDER — SODIUM CHLORIDE 9 MG/ML
INJECTION, SOLUTION INTRAVENOUS CONTINUOUS
Status: ACTIVE | OUTPATIENT
Start: 2020-04-13 | End: 2020-04-14

## 2020-04-13 RX ORDER — IBUPROFEN 200 MG
24 TABLET ORAL
Status: DISCONTINUED | OUTPATIENT
Start: 2020-04-13 | End: 2020-04-15 | Stop reason: HOSPADM

## 2020-04-13 RX ORDER — BISACODYL 10 MG
10 SUPPOSITORY, RECTAL RECTAL ONCE
Status: COMPLETED | OUTPATIENT
Start: 2020-04-13 | End: 2020-04-13

## 2020-04-13 RX ORDER — INSULIN ASPART 100 [IU]/ML
0-5 INJECTION, SOLUTION INTRAVENOUS; SUBCUTANEOUS
Status: DISCONTINUED | OUTPATIENT
Start: 2020-04-13 | End: 2020-04-15 | Stop reason: HOSPADM

## 2020-04-13 RX ORDER — IBUPROFEN 200 MG
16 TABLET ORAL
Status: DISCONTINUED | OUTPATIENT
Start: 2020-04-13 | End: 2020-04-15 | Stop reason: HOSPADM

## 2020-04-13 RX ADMIN — HYDRALAZINE HYDROCHLORIDE 10 MG: 20 INJECTION INTRAMUSCULAR; INTRAVENOUS at 06:04

## 2020-04-13 RX ADMIN — INSULIN ASPART 4 UNITS: 100 INJECTION, SOLUTION INTRAVENOUS; SUBCUTANEOUS at 11:04

## 2020-04-13 RX ADMIN — HYDRALAZINE HYDROCHLORIDE 50 MG: 50 TABLET, FILM COATED ORAL at 08:04

## 2020-04-13 RX ADMIN — INSULIN ASPART 2 UNITS: 100 INJECTION, SOLUTION INTRAVENOUS; SUBCUTANEOUS at 04:04

## 2020-04-13 RX ADMIN — FUROSEMIDE 60 MG: 10 INJECTION, SOLUTION INTRAMUSCULAR; INTRAVENOUS at 04:04

## 2020-04-13 RX ADMIN — CEFTRIAXONE 1 G: 1 INJECTION, SOLUTION INTRAVENOUS at 08:04

## 2020-04-13 RX ADMIN — POLYETHYLENE GLYCOL 3350 17 G: 17 POWDER, FOR SOLUTION ORAL at 08:04

## 2020-04-13 RX ADMIN — PANTOPRAZOLE SODIUM 40 MG: 40 TABLET, DELAYED RELEASE ORAL at 08:04

## 2020-04-13 RX ADMIN — SODIUM CHLORIDE: 0.9 INJECTION, SOLUTION INTRAVENOUS at 08:04

## 2020-04-13 RX ADMIN — ALPRAZOLAM 0.5 MG: 0.5 TABLET ORAL at 08:04

## 2020-04-13 RX ADMIN — BISACODYL 10 MG RECTAL SUPPOSITORY 10 MG: at 03:04

## 2020-04-13 RX ADMIN — SODIUM CHLORIDE: 0.9 INJECTION, SOLUTION INTRAVENOUS at 06:04

## 2020-04-13 RX ADMIN — VANCOMYCIN HYDROCHLORIDE 2250 MG: 100 INJECTION, POWDER, LYOPHILIZED, FOR SOLUTION INTRAVENOUS at 06:04

## 2020-04-13 RX ADMIN — SIMVASTATIN 40 MG: 20 TABLET, FILM COATED ORAL at 08:04

## 2020-04-13 RX ADMIN — NIFEDIPINE 90 MG: 30 TABLET, FILM COATED, EXTENDED RELEASE ORAL at 08:04

## 2020-04-13 RX ADMIN — METOPROLOL SUCCINATE 25 MG: 25 TABLET, EXTENDED RELEASE ORAL at 08:04

## 2020-04-13 RX ADMIN — AMIODARONE HYDROCHLORIDE 200 MG: 200 TABLET ORAL at 08:04

## 2020-04-13 RX ADMIN — INSULIN ASPART 1 UNITS: 100 INJECTION, SOLUTION INTRAVENOUS; SUBCUTANEOUS at 09:04

## 2020-04-13 RX ADMIN — DOXAZOSIN 4 MG: 2 TABLET ORAL at 08:04

## 2020-04-13 NOTE — PLAN OF CARE
Fall precautions maintained, pt free from injuries/fall.  Repositions w satff assist x2  Ambulates w satff assist x3  DNR in place. Family updated on patients status  POC and meds discussed, both verbalized understanding.  Side rails x2, bed locked and low, phone and call light w/in reach.  Chart check done. Will cont to monitor.

## 2020-04-13 NOTE — ASSESSMENT & PLAN NOTE
[facility] hypodense liver lesions on US, unable to complete CT due to poor kidney function. Plan is for biopsy per IR on Wednesday, Eliquis has been held to prepare for procedure.     --Continue supportive care  --Will review results when available

## 2020-04-13 NOTE — PT/OT/SLP EVAL
Occupational Therapy   Evaluation    Name: Veronica Sue  MRN: [id number]  Admitting Diagnosis:  Encephalopathy, metabolic      Recommendations:     Discharge Recommendations:    Discharge Equipment Recommendations:  none  Barriers to discharge:  None    Assessment:     Veronica Sue is a 80 y.o. female with a medical diagnosis of Encephalopathy, metabolic.  She presents with debility and generalized weakness. Performance deficits affecting function: weakness, impaired self care skills, impaired balance, decreased safety awareness, impaired endurance, impaired functional mobilty, gait instability.      Rehab Prognosis: Fair; patient would benefit from acute skilled OT services to address these deficits and reach maximum level of function.       Plan:     Patient to be seen 3 x/week to address the above listed problems via self-care/home management, therapeutic exercises, therapeutic activities  · Plan of Care Expires:    · Plan of Care Reviewed with: patient    Subjective     Chief Complaint: debility and generalized weakness  Patient/Family Comments/goals:     Occupational Profile:  Living Environment:  Lives with granddaughter  Previous level of function:   Roles and Routines: occupational therapy  Equipment Used at Home:  walker, rolling, none  Assistance upon Discharge:     Pain/Comfort:  · Pain Rating 1: 0/10    Patients cultural, spiritual, Jewish conflicts given the current situation:      Objective:     Communicated with: nurse and epic chart review prior to session.  Patient found HOB elevated with telemetry, peripheral IV, PureWick upon OT entry to room.    General Precautions: Standard, fall   Orthopedic Precautions:N/A   Braces: N/A     Occupational Performance:    Bed Mobility:    · Patient completed Rolling/Turning to Right with maximal assistance  · Patient completed Scooting/Bridging with maximal assistance  · Patient completed Supine to Sit with maximal assistance    Functional  Mobility/Transfers:  · Patient completed Sit <> Stand Transfer with maximal assistance  with  rolling walker   · Patient completed Bed <> Chair Transfer using Step Transfer technique with maximal assistance with rolling walker  · Functional Mobility:  Pt req max a with functional mobility with rolling walker      Activities of Daily Living:  · Upper Body Dressing: maximal assistance .    Cognitive/Visual Perceptual:  Cognitive/Psychosocial Skills:     -       Oriented to: aox 4   -       Follows Commands/attention:Follows one-step commands  -       Communication: clear/fluent  -       Memory: to accurately assess  -       Safety awareness/insight to disability: impaired     Physical Exam:      AMPAC 6 Click ADL:  AMPAC Total Score: 12    Treatment & Education:  Education:    Patient left up in chair with all lines intact, call button in reach, chair alarm on and nurse notified    GOALS:   Multidisciplinary Problems     Occupational Therapy Goals        Problem: Occupational Therapy Goal    Goal Priority Disciplines Outcome Interventions   Occupational Therapy Goal     OT, PT/OT     Description:  OT goals to be met by 4-20-20  Min a with ue dressing  Pt will tolerate 1 set x 10 reps b  ue prom exercise  Min a with bsc t/f's                      History:     Past Medical History:   Diagnosis Date    Anticoagulant long-term use     Anxiety     Arthritis     Atrial fibrillation     CHF (congestive heart failure)     CKD (chronic kidney disease) stage 4, GFR 15-29 ml/min     Coronary artery disease     Diabetes     Diastolic heart failure     DVT, lower extremity, left peroneal vein     GERD (gastroesophageal reflux disease)     Hypertension        Past Surgical History:   Procedure Laterality Date    APPENDECTOMY      BREAST BIOPSY      CARDIAC CATHETERIZATION      HYSTERECTOMY      TONSILLECTOMY         Time Tracking:     OT Date of Treatment: 04/13/20  OT Start Time: 0910  OT Stop Time: 0935  OT Total  Time (min): 25 min    Billable Minutes:Evaluation 10 minutes  Therapeutic Activity 15 minutes\    Flory Lima, OT  4/13/2020

## 2020-04-13 NOTE — NURSING
Interventional Radiology:  Per secure chat w/ Dr. Pink, biopsy will be done Wednesday afternoon d/t bleeding risk.  I called floor nurse and notified of biopsy not being done today.

## 2020-04-13 NOTE — PLAN OF CARE
ST assessed pt's swallow at bedside with pt only taking 3 small sips of water and 1 small bite of puree.  She did exhibit coughing and throat clearing with intake of thin liquids indicating likely supraglottic penetration vs aspiration.  Pt refused further intake.  ST will reassess pt's swallow when she is agreeable.

## 2020-04-13 NOTE — PLAN OF CARE
attempted to contact patient in room to complete assessment but did not receive a response. Sw called patient's granddaughter Muna Baker at [phone] to complete an assessment. Sw did not receive an answer. Sw left Vm and will f/u. Cass completed medical record review.      04/13/20 1433   Discharge Assessment   Assessment Type Discharge Planning Assessment   Confirmed/corrected address and phone number on facesheet? No   Assessment information obtained from? Medical Record   Prior to hospitilization cognitive status: Unable to Assess   Current cognitive status: Unable to Assess   Is patient able to care for self after discharge? Unable to determine at this time (comments)   Discharge Plan A Skilled Nursing Facility   Discharge Plan B Home;Home Health   DME Needed Upon Discharge  none   Patient/Family in Agreement with Plan unable to assess

## 2020-04-13 NOTE — PROGRESS NOTES
[facility] -   Nephrology  Progress Note    Patient Name: Veronica Sue  MRN: [id number]  Admission Date: 4/11/2020  Hospital Length of Stay: 2 days  Attending Provider: Claudia Jewell MD   Primary Care Physician: Primary Doctor No  Principal Problem:Encephalopathy, metabolic    Subjective:     HPI: 80 year old female with arthritis, atrial fibrillation, CHF, CKD 4, CAD, DM2, LE DVT, HTN presents to [facility] with weakness, nausea and vomiting, constipation.   Work-up revealed severe hypercalcemia.    Nephrology was consulted to help with patient's renal care while she is admitted at [facility]. I saw and examined patient in her hospital room. Patient is complaining of mild abdominal pain, nausea/vomiting, constipation, dysuria. No chest pain, SOB, LE edema, fever.     Chart review revealed that patient suffers drom CKD 4 with baseline creatinine of about 2. Last Ca from 11/23/19 was 9.4.     Interval History:     4/13/20: patient is resting comfortably, no issues at present. Hypercalcemia has improved with calcium declining to 10.9.         Review of patient's allergies indicates:   Allergen Reactions    Iodinated contrast media Swelling     Taken amiodorone    Levofloxacin     Minoxidil     Rosiglitazone      Current Facility-Administered Medications   Medication Frequency    ALPRAZolam tablet 0.5 mg BID    amiodarone tablet 200 mg Daily    cefTRIAXone (ROCEPHIN) 1 g in dextrose 5 % 50 mL IVPB Q24H    dextrose 10 % infusion PRN    dextrose 10 % infusion PRN    doxazosin tablet 4 mg Daily    glucagon (human recombinant) injection 1 mg PRN    glucose chewable tablet 16 g PRN    glucose chewable tablet 24 g PRN    hydrALAZINE injection 10 mg Q8H PRN    hydrALAZINE tablet 50 mg BID    insulin aspart U-100 pen 0-5 Units QID (AC + HS) PRN    levothyroxine tablet 88 mcg Before breakfast    metoprolol succinate (TOPROL-XL) 24 hr tablet 25 mg Daily    NIFEdipine 24 hr tablet 90 mg Daily    ondansetron  disintegrating tablet 4 mg Q6H PRN    ondansetron injection 4 mg Q8H PRN    pantoprazole EC tablet 40 mg Daily    polyethylene glycol packet 17 g Daily    simvastatin tablet 40 mg QHS    sodium chloride 0.9% flush 10 mL PRN    vancomycin - pharmacy to dose pharmacy to manage frequency       Objective:     Vital Signs (Most Recent):  Temp: 97.8 °F (36.6 °C) (04/13/20 0707)  Pulse: 71 (04/13/20 0707)  Resp: 18 (04/13/20 0707)  BP: (!) 160/70 (04/13/20 0707)  SpO2: 98 % (04/13/20 0707)  O2 Device (Oxygen Therapy): room air (04/12/20 1705) Vital Signs (24h Range):  Temp:  [97.4 °F (36.3 °C)-98.8 °F (37.1 °C)] 97.8 °F (36.6 °C)  Pulse:  [60-90] 71  Resp:  [14-22] 18  SpO2:  [94 %-98 %] 98 %  BP: (131-186)/(63-90) 160/70     Weight: 85.5 kg (188 lb 7.9 oz) (04/11/20 2308)  Body mass index is 31.37 kg/m².  Body surface area is 1.98 meters squared.    I/O last 3 completed shifts:  In: 3210 [I.V.:1860; IV Piggyback:1350]  Out: 1250 [Urine:1250]    Physical Exam   Constitutional: She appears well-developed and well-nourished.   HENT:   Head: Normocephalic.   Eyes: Pupils are equal, round, and reactive to light.   Neck: No thyromegaly present.   Cardiovascular: Normal rate and regular rhythm. Exam reveals no friction rub.   Pulmonary/Chest: Effort normal and breath sounds normal. She has no wheezes. She exhibits no tenderness.   Abdominal: Soft. Bowel sounds are normal. She exhibits no distension. There is no tenderness.   Musculoskeletal: She exhibits no edema.   Lymphadenopathy:     She has no cervical adenopathy.   Neurological: She is alert.   Skin: Skin is warm and dry. No rash noted.       Significant Labs:  Lab Results   Component Value Date    CREATININE 1.4 04/13/2020    BUN 18 04/13/2020     04/13/2020    K 3.8 04/13/2020     04/13/2020    CO2 20 (L) 04/13/2020     Lab Results   Component Value Date    PTH 5.9 (L) 04/12/2020    CALCIUM 10.9 (H) 04/13/2020    PHOS 2.2 (L) 04/13/2020     Lab Results    Component Value Date    ALBUMIN 3.1 (L) 04/13/2020     Lab Results   Component Value Date    WBC 6.58 04/13/2020    HGB 10.5 (L) 04/13/2020    HCT 33.5 (L) 04/13/2020    MCV 90 04/13/2020    PLT 87 (L) 04/13/2020       Recent Labs   Lab 04/13/20  0515   MG 1.7     PTHrp: pending    Significant Imaging:  Imaging Results          US Abdomen Limited (Final result)  Result time 04/12/20 15:44:10    Final result by Gerry Mckenzie MD (04/12/20 15:44:10)                 Impression:      1.  Abnormal study.  There are too numerous to count hepatic masses measuring up to 3.1 cm, concerning for metastasis and less likely multiple focal primary neoplasm.    2.  Borderline splenomegaly.  The splenic calcifications, possibly related to old granulomatous disease.    3.  Small left kidney with cortical thinning.  Medical renal disease could have this appearance.  Clinical correlation is advised.    4.  Incidental findings as noted above.  Negative for acute process otherwise.      Electronically signed by: Gerry Mckenzie MD  Date:    04/12/2020  Time:    15:44             Narrative:    EXAMINATION:  US ABDOMEN LIMITED, color flow and spectral Doppler interrogation    CLINICAL HISTORY:  Hypercalcemia of unknown origin; elevated GGT; mildly elevated LFTs, suspicious for malignancy.;    COMPARISON:  No comparison studies are available.    FINDINGS:  The liver is heterogeneous in material with multiple hypodense lesions measuring up to 3.1 cm.  There is fatty infiltration of the liver as well.  The liver measures 17 cm. The gallbladder is normal. Negative for a sonographic Moreira's sign.  The common duct measures 3 mm. The right kidney measures 9 cm.  There is cortical thinning of the right kidney.  The right kidney is without focal solid or cystic masses, hydronephrosis, perinephric fluid collections or shadowing stones. The spleen measures 11.0 cm and is normal in appearance.  The visualized portions of the pancreas are normal.   There are calcifications in the spleen, likely related to calcified granulomas.  The aorta and IVC are normal in caliber.  Hepatopedal flow is documented in the portal vein.  The flow velocity in the hepatic vein is 17 centimeters/second.                               CT Head Without Contrast (Final result)  Result time 04/11/20 20:49:18    Final result by Calvin Seth MD (04/11/20 20:49:18)                 Impression:      No CT evidence of an acute intracranial process.  Age-appropriate cerebral atrophy with prominent symmetric bifrontal extra-axial fluid.    All CT scans at this facility are performed  using dose modulation techniques as appropriate to performed exam including the following:  automated exposure control; adjustment of mA and/or kV according to the patients size (this includes techniques or standardized protocols for targeted exams where dose is matched to indication/reason for exam: i.e. extremities or head);  iterative reconstruction technique.      Electronically signed by: Calvin Seth MD  Date:    04/11/2020  Time:    20:49             Narrative:    EXAMINATION:  CT HEAD WITHOUT CONTRAST    CLINICAL HISTORY:  Confusion/delirium, altered LOC, unexplained;    TECHNIQUE:  Routine noncontrast head CT.    COMPARISON:  None    FINDINGS:  There is no acute intracranial hemorrhage or abnormal extra-axial fluid collection.  There is advanced cerebral atrophy with bilateral symmetric bifrontal extra-axial fluid.  Ventricular-sulcal congruence.  There is no abnormal increased or decreased density within the brain parenchyma.  Gray-white differentiation preserved.  There is no intracranial mass or mass effect.  The calvarium is intact.  Visualized paranasal sinuses and mastoids are well aerated.                               X-Ray Chest AP Portable (Final result)  Result time 04/11/20 20:24:18    Final result by Calvin Seth MD (04/11/20 20:24:18)                 Impression:      No acute  process.      Electronically signed by: Calvin Seth MD  Date:    04/11/2020  Time:    20:24             Narrative:    EXAMINATION:  XR CHEST AP PORTABLE    CLINICAL HISTORY:  Transient alteration of awareness, unspecified    FINDINGS:  No prior study.  Normal size heart.  Aortic arch calcification.  No congestion.  Lungs are clear.  Multilevel anterior cervical fusion.                                  Assessment/Plan:     Disorder of electrolytes  Hypomagnesemia: has resolved.     Hypophosphatemia: mild, monitor for now.     CKD (chronic kidney disease) stage 4, GFR 15-29 ml/min  Renal function has improved with creatinine declining to 1.4.     Diastolic heart failure  Patient on IV fluids, monitor carefully for volume overload.     Accelerated hypertension  Adjust meds as indicated to obtain better BP control.     Hypercalcemia of malignancy  Patient presents with constipation, abdominal pain, MS change (as per family) which are typical hypercalcemia symptoms.  No clear etiology. PTH appropriately suppressed. SPEP and PTHrp are pending. Vitamin D level not elevated. S/p IV fluids and lasix. Calcium has declined to 10.9 today (albumin 3.1). Work-up revealed liver masses suggestive of liver caner. Patient may suffer from hypercalcemia due to cancer-producing PTHrp. Monitor closely. May require biphosphonates in near future.       Acute cystitis without hematuria  Continue antibiotics.         Thank you for your consult. I will follow-up with patient. Please contact us if you have any additional questions.    Reed Bledsoe MD  Nephrology  [facility] -

## 2020-04-13 NOTE — NURSING
Turned to right side propped w  Pillows. Bed alarm in use. Patient sleeping with eyes closed. No s/s of distress noted

## 2020-04-13 NOTE — SIGNIFICANT EVENT
Notified by RN of blood culture growing gram positive cocci with clusters resembling Staph x 1 bottle.  Pharmacy consulted to dose Vancomycin. Primary team to consider ID consult if needed.

## 2020-04-13 NOTE — SUBJECTIVE & OBJECTIVE
Interval History:   Pt remains very weak in general . She is awake , oriented to self. Speech is slow and does not make eye contact. Fine tremor is noted ria hands This morning per nursing staff , pt did not want to eat or drink. Speech therapy is consulted for swallow eval.  IR consult is placed for CT guided liver biopsy and will be performed on Wednesday . Eliquis is on hold in that regard . Hem/Onc also consulted . Labs resulted Hb 10.5, Pl clumped , creatinine down to 1.4, Calcium down to 10.9 , Intact PTH 5.9, AFP 72073, CA 19-9 67,   15      Review of Systems   Constitutional: Positive for activity change, appetite change, fatigue and unexpected weight change (20 pounds in the last month ). Negative for fever.   HENT: Negative for sore throat.    Eyes: Negative for visual disturbance.   Respiratory: Negative for cough, chest tightness and shortness of breath.    Cardiovascular: Negative for chest pain, palpitations and leg swelling.   Gastrointestinal: Positive for constipation and nausea. Negative for abdominal distention, abdominal pain, diarrhea and vomiting.   Endocrine: Negative for polyuria.   Genitourinary: Negative for decreased urine volume, dysuria, flank pain, frequency and hematuria.   Musculoskeletal: Positive for gait problem. Negative for back pain.   Skin: Negative for rash.   Neurological: Positive for tremors. Negative for syncope, speech difficulty, weakness, light-headedness and headaches.   Psychiatric/Behavioral: Positive for confusion. Negative for hallucinations and sleep disturbance.     Objective:     Vital Signs (Most Recent):  Temp: 97.8 °F (36.6 °C) (04/13/20 0707)  Pulse: 71 (04/13/20 0707)  Resp: 18 (04/13/20 0707)  BP: (!) 160/70 (04/13/20 0707)  SpO2: 98 % (04/13/20 0707) Vital Signs (24h Range):  Temp:  [97.4 °F (36.3 °C)-98.8 °F (37.1 °C)] 97.8 °F (36.6 °C)  Pulse:  [60-90] 71  Resp:  [14-22] 18  SpO2:  [94 %-98 %] 98 %  BP: (131-186)/(63-90) 160/70     Weight: 85.5 kg  (188 lb 7.9 oz)  Body mass index is 31.37 kg/m².    Intake/Output Summary (Last 24 hours) at 4/13/2020 1123  Last data filed at 4/13/2020 0932  Gross per 24 hour   Intake 1488.33 ml   Output 650 ml   Net 838.33 ml      Physical Exam    Significant Labs:   CBC:   Recent Labs   Lab 04/1940 04/12/20  0430 04/13/20  0515   WBC 6.31 9.46 6.58   HGB 12.6 11.9* 10.5*   HCT 40.7 38.1 33.5*    309 87*     CMP:   Recent Labs   Lab 04/1940 04/12/20 0430 04/13/20  0515    140  140 141   K 4.5 3.9  3.9 3.8    109  109 109   CO2 24 19*  19* 20*   * 325*  325* 253*   BUN 23 20  20 18   CREATININE 2.0* 1.5*  1.5* 1.4   CALCIUM 14.1* 13.3*  13.3* 10.9*   PROT 7.8 7.2 6.4   ALBUMIN 3.7 3.4*  3.4* 3.1*   BILITOT 0.6 0.5 0.5   ALKPHOS 259* 242* 184*   AST 96* 86* 68*   ALT 71* 67* 50*   ANIONGAP 11 12  12 12   EGFRNONAA 23.1* 33*  33* 36*       Significant Imaging: U/S abd -     1.  Abnormal study.  There are too numerous to count hepatic masses measuring up to 3.1 cm, concerning for metastasis and less likely multiple focal primary neoplasm.    2.  Borderline splenomegaly.  The splenic calcifications, possibly related to old granulomatous disease.    3.  Small left kidney with cortical thinning.  Medical renal disease could have this appearance.  Clinical correlation is advised.

## 2020-04-13 NOTE — PT/OT/SLP EVAL
Physical Therapy Evaluation    Patient Name:  Veronica Sue   MRN:  [id number]    Recommendations:     Discharge Recommendations:  nursing facility, skilled   Discharge Equipment Recommendations: (TBD)   Barriers to discharge: Decreased caregiver support    Assessment:     Veronica Sue is a 80 y.o. female admitted with a medical diagnosis of Encephalopathy, metabolic.  She presents with the following impairments/functional limitations:  weakness, gait instability, impaired balance, impaired endurance, impaired functional mobilty, impaired self care skills, decreased safety awareness, decreased lower extremity function, decreased ROM, impaired cognition .    Rehab Prognosis: Fair; patient would benefit from acute skilled PT services to address these deficits and reach maximum level of function.    Recent Surgery: * No surgery found *      Plan:     During this hospitalization, patient to be seen   to address the identified rehab impairments via therapeutic activities, therapeutic exercises, gait training and progress toward the following goals:    · Plan of Care Expires:  04/20/20    Subjective     Chief Complaint: NONE   Patient/Family Comments/goals: NONE STATED   Pain/Comfort:  · Pain Rating 1: 0/10  · Pain Rating Post-Intervention 1: 0/10    Patients cultural, spiritual, Confucianist conflicts given the current situation:      Living Environment:  PT LIVES AT HOME ALONE AND WAS MOD I WITH RW PER NURSE.PT IS A POOR HISTORIAN. PT ONLY ORIENTED TO SELF   Prior to admission, patients level of function was MOD I WITH RW.  Equipment used at home:  .  DME owned (not currently used): none.  Upon discharge, patient will have assistance from UNKNOWN .    Objective:     Communicated with NURSE MELARA AND Epic CHART REVIEW  prior to session.  Patient found supine with telemetry, peripheral IV, PureWick  upon PT entry to room.    General Precautions: Standard, fall   Orthopedic Precautions:N/A   Braces: N/A      Exams:  · Cognitive Exam:  Patient is oriented to Person  · RLE ROM: LIMITED  · RLE Strength: LIMITED  · LLE ROM: LIMITED  · LLE Strength: LIMITED    Functional Mobility:  PT MET IN RM LOG ROLLED AND SEATED EOB WITH MAX A. PT SCOOTED TO EOB WITH MAX A. PT REQUIRED INC CUES FOR MOBILITY. PT STOOD WITH RW AND MAX A FOR T/F TO CHAIR WITH RW AND MAX AX 2. PT LEFT SEATED IN CHAIR WITH ALL NEEDS MET AND CALL BELL IN REACH.     AM-PAC 6 CLICK MOBILITY  Total Score:10     Patient left up in chair with call button in reach and chair alarm on.    GOALS:   Multidisciplinary Problems     Physical Therapy Goals        Problem: Physical Therapy Goal    Goal Priority Disciplines Outcome Goal Variances Interventions   Physical Therapy Goal     PT, PT/OT      Description:  PT WILL BE SEEN FOR P.T. FOR A MIN OF 5 OUT OF 7 DAYS A WEEK  LT20  1. PT WILL COMPLETE BED MOBILITY WITH MOD A  2. PT WILL T/F TO CHAIR WITH RW AND MOD A  3. PT WILL COMPLETE GT TRAINING X 20' WITH RW AND MOD A  4. PT WILL COMPLETE B LE TE X 20 REPS                    History:     Past Medical History:   Diagnosis Date    Anticoagulant long-term use     Anxiety     Arthritis     Atrial fibrillation     CHF (congestive heart failure)     CKD (chronic kidney disease) stage 4, GFR 15-29 ml/min     Coronary artery disease     Diabetes     Diastolic heart failure     DVT, lower extremity, left peroneal vein     GERD (gastroesophageal reflux disease)     Hypertension        Past Surgical History:   Procedure Laterality Date    APPENDECTOMY      BREAST BIOPSY      CARDIAC CATHETERIZATION      HYSTERECTOMY      TONSILLECTOMY         Time Tracking:     PT Received On: 20  PT Start Time: 1115     PT Stop Time: 1140  PT Total Time (min): 25 min     Billable Minutes: Evaluation 15 and Therapeutic Activity 10      Saray Osei, PT  2020

## 2020-04-13 NOTE — SUBJECTIVE & OBJECTIVE
Interval History:     4/13/20: patient is resting comfortably, no issues at present. Hypercalcemia has improved with calcium declining to 10.9.         Review of patient's allergies indicates:   Allergen Reactions    Iodinated contrast media Swelling     Taken amiodorone    Levofloxacin     Minoxidil     Rosiglitazone      Current Facility-Administered Medications   Medication Frequency    ALPRAZolam tablet 0.5 mg BID    amiodarone tablet 200 mg Daily    cefTRIAXone (ROCEPHIN) 1 g in dextrose 5 % 50 mL IVPB Q24H    dextrose 10 % infusion PRN    dextrose 10 % infusion PRN    doxazosin tablet 4 mg Daily    glucagon (human recombinant) injection 1 mg PRN    glucose chewable tablet 16 g PRN    glucose chewable tablet 24 g PRN    hydrALAZINE injection 10 mg Q8H PRN    hydrALAZINE tablet 50 mg BID    insulin aspart U-100 pen 0-5 Units QID (AC + HS) PRN    levothyroxine tablet 88 mcg Before breakfast    metoprolol succinate (TOPROL-XL) 24 hr tablet 25 mg Daily    NIFEdipine 24 hr tablet 90 mg Daily    ondansetron disintegrating tablet 4 mg Q6H PRN    ondansetron injection 4 mg Q8H PRN    pantoprazole EC tablet 40 mg Daily    polyethylene glycol packet 17 g Daily    simvastatin tablet 40 mg QHS    sodium chloride 0.9% flush 10 mL PRN    vancomycin - pharmacy to dose pharmacy to manage frequency       Objective:     Vital Signs (Most Recent):  Temp: 97.8 °F (36.6 °C) (04/13/20 0707)  Pulse: 71 (04/13/20 0707)  Resp: 18 (04/13/20 0707)  BP: (!) 160/70 (04/13/20 0707)  SpO2: 98 % (04/13/20 0707)  O2 Device (Oxygen Therapy): room air (04/12/20 1705) Vital Signs (24h Range):  Temp:  [97.4 °F (36.3 °C)-98.8 °F (37.1 °C)] 97.8 °F (36.6 °C)  Pulse:  [60-90] 71  Resp:  [14-22] 18  SpO2:  [94 %-98 %] 98 %  BP: (131-186)/(63-90) 160/70     Weight: 85.5 kg (188 lb 7.9 oz) (04/11/20 2308)  Body mass index is 31.37 kg/m².  Body surface area is 1.98 meters squared.    I/O last 3 completed shifts:  In: 5872  [I.V.:1860; IV Piggyback:1350]  Out: 1250 [Urine:1250]    Physical Exam   Constitutional: She appears well-developed and well-nourished.   HENT:   Head: Normocephalic.   Eyes: Pupils are equal, round, and reactive to light.   Neck: No thyromegaly present.   Cardiovascular: Normal rate and regular rhythm. Exam reveals no friction rub.   Pulmonary/Chest: Effort normal and breath sounds normal. She has no wheezes. She exhibits no tenderness.   Abdominal: Soft. Bowel sounds are normal. She exhibits no distension. There is no tenderness.   Musculoskeletal: She exhibits no edema.   Lymphadenopathy:     She has no cervical adenopathy.   Neurological: She is alert.   Skin: Skin is warm and dry. No rash noted.       Significant Labs:  Lab Results   Component Value Date    CREATININE 1.4 04/13/2020    BUN 18 04/13/2020     04/13/2020    K 3.8 04/13/2020     04/13/2020    CO2 20 (L) 04/13/2020     Lab Results   Component Value Date    PTH 5.9 (L) 04/12/2020    CALCIUM 10.9 (H) 04/13/2020    PHOS 2.2 (L) 04/13/2020     Lab Results   Component Value Date    ALBUMIN 3.1 (L) 04/13/2020     Lab Results   Component Value Date    WBC 6.58 04/13/2020    HGB 10.5 (L) 04/13/2020    HCT 33.5 (L) 04/13/2020    MCV 90 04/13/2020    PLT 87 (L) 04/13/2020       Recent Labs   Lab 04/13/20  0515   MG 1.7     PTHrp: pending    Significant Imaging:  Imaging Results          US Abdomen Limited (Final result)  Result time 04/12/20 15:44:10    Final result by Gerry Mckenzie MD (04/12/20 15:44:10)                 Impression:      1.  Abnormal study.  There are too numerous to count hepatic masses measuring up to 3.1 cm, concerning for metastasis and less likely multiple focal primary neoplasm.    2.  Borderline splenomegaly.  The splenic calcifications, possibly related to old granulomatous disease.    3.  Small left kidney with cortical thinning.  Medical renal disease could have this appearance.  Clinical correlation is advised.    4.   Incidental findings as noted above.  Negative for acute process otherwise.      Electronically signed by: Gerry Mckenzie MD  Date:    04/12/2020  Time:    15:44             Narrative:    EXAMINATION:  US ABDOMEN LIMITED, color flow and spectral Doppler interrogation    CLINICAL HISTORY:  Hypercalcemia of unknown origin; elevated GGT; mildly elevated LFTs, suspicious for malignancy.;    COMPARISON:  No comparison studies are available.    FINDINGS:  The liver is heterogeneous in material with multiple hypodense lesions measuring up to 3.1 cm.  There is fatty infiltration of the liver as well.  The liver measures 17 cm. The gallbladder is normal. Negative for a sonographic Moreira's sign.  The common duct measures 3 mm. The right kidney measures 9 cm.  There is cortical thinning of the right kidney.  The right kidney is without focal solid or cystic masses, hydronephrosis, perinephric fluid collections or shadowing stones. The spleen measures 11.0 cm and is normal in appearance.  The visualized portions of the pancreas are normal.  There are calcifications in the spleen, likely related to calcified granulomas.  The aorta and IVC are normal in caliber.  Hepatopedal flow is documented in the portal vein.  The flow velocity in the hepatic vein is 17 centimeters/second.                               CT Head Without Contrast (Final result)  Result time 04/11/20 20:49:18    Final result by Calvin Seth MD (04/11/20 20:49:18)                 Impression:      No CT evidence of an acute intracranial process.  Age-appropriate cerebral atrophy with prominent symmetric bifrontal extra-axial fluid.    All CT scans at this facility are performed  using dose modulation techniques as appropriate to performed exam including the following:  automated exposure control; adjustment of mA and/or kV according to the patients size (this includes techniques or standardized protocols for targeted exams where dose is matched to  indication/reason for exam: i.e. extremities or head);  iterative reconstruction technique.      Electronically signed by: Calvin Seth MD  Date:    04/11/2020  Time:    20:49             Narrative:    EXAMINATION:  CT HEAD WITHOUT CONTRAST    CLINICAL HISTORY:  Confusion/delirium, altered LOC, unexplained;    TECHNIQUE:  Routine noncontrast head CT.    COMPARISON:  None    FINDINGS:  There is no acute intracranial hemorrhage or abnormal extra-axial fluid collection.  There is advanced cerebral atrophy with bilateral symmetric bifrontal extra-axial fluid.  Ventricular-sulcal congruence.  There is no abnormal increased or decreased density within the brain parenchyma.  Gray-white differentiation preserved.  There is no intracranial mass or mass effect.  The calvarium is intact.  Visualized paranasal sinuses and mastoids are well aerated.                               X-Ray Chest AP Portable (Final result)  Result time 04/11/20 20:24:18    Final result by Calvin Seth MD (04/11/20 20:24:18)                 Impression:      No acute process.      Electronically signed by: Calvin Seth MD  Date:    04/11/2020  Time:    20:24             Narrative:    EXAMINATION:  XR CHEST AP PORTABLE    CLINICAL HISTORY:  Transient alteration of awareness, unspecified    FINDINGS:  No prior study.  Normal size heart.  Aortic arch calcification.  No congestion.  Lungs are clear.  Multilevel anterior cervical fusion.

## 2020-04-13 NOTE — PLAN OF CARE
Alert and oriented x3. Respirations even and unlabored. POC reviewed. Chart check complete. Pt. C/o constipation earlier in the shift. MD aware and orders were placed. Safety measures intact and ongoing. Denies other needs at this time. Bed alarm on. Call light within reach. No acute distress noted. Will continue to monitor.

## 2020-04-13 NOTE — ASSESSMENT & PLAN NOTE
Patient presents with constipation, abdominal pain, MS change (as per family) which are typical hypercalcemia symptoms.  No clear etiology. PTH appropriately suppressed. SPEP and PTHrp are pending. Vitamin D level not elevated. S/p IV fluids and lasix. Calcium has declined to 10.9 today (albumin 3.1). Work-up revealed liver masses suggestive of liver caner. Patient may suffer from hypercalcemia due to cancer-producing PTHrp. Monitor closely. May require biphosphonates in near future.

## 2020-04-13 NOTE — HPI
80 year old female with arthritis, atrial fibrillation, CHF, CKD 4, CAD, DM2, LE DVT, HTN presents to [facility] with weakness, nausea and vomiting, constipation.   Work-up revealed severe hypercalcemia and numerous hypodense liver lesions, largest measures 3.1 cm, CT not completed due to impaired renal function. Hem/Onc consulted due to lever lesions and hypercalcemia. Patient is scheduled for liver biopsy with IR for Wednesday.

## 2020-04-13 NOTE — ASSESSMENT & PLAN NOTE
- Hypercalcemia treated with volume expansion with NS and Lasix for renal excretion of calcium   -Hypomagnesemia and hypophosphatemia noted on admit and replete accordingly

## 2020-04-13 NOTE — NURSING
Patient adjusted up in bed.Bed alarm in use. Dry linens applied. Call light in reach no S/S of distress noted

## 2020-04-13 NOTE — PT/OT/SLP EVAL
Speech Language Pathology Evaluation  Bedside Swallow    Patient Name:  Veronica Sue   MRN:  [id number]  Admitting Diagnosis: Encephalopathy, metabolic    Recommendations:                 General Recommendations:  Dysphagia therapy  Diet recommendations:  Puree,     Aspiration Precautions: HOB to 90 degrees, Remain upright 30 minutes post meal and Small bites/sips   General Precautions: Standard, aspiration, fall      History:     Past Medical History:   Diagnosis Date    Anticoagulant long-term use     Anxiety     Arthritis     Atrial fibrillation     CHF (congestive heart failure)     CKD (chronic kidney disease) stage 4, GFR 15-29 ml/min     Coronary artery disease     Diabetes     Diastolic heart failure     DVT, lower extremity, left peroneal vein     GERD (gastroesophageal reflux disease)     Hypertension        Past Surgical History:   Procedure Laterality Date    APPENDECTOMY      BREAST BIOPSY      CARDIAC CATHETERIZATION      HYSTERECTOMY      TONSILLECTOMY         Subjective     Pt was seen sitting in a chair at bedside slumped forward.  She stated that she was too weak and tired to stay sitting in a chair.  PCT assisted SLP in getting pt back to bed.  The pt was very sleepy and confused not knowing where she is or how she got here.   Patient goals: none stated     Pain/Comfort:  · Pain Rating 1: 0/10    Objective:     Oral Musculature Evaluation  · Oral Musculature: general weakness    Bedside Swallow Eval:   Consistencies Assessed:  · Thin liquids and puree     Oral Phase:   · Lingual residue    Pharyngeal Phase:   · coughing/choking  · decreased hyolaryngeal excursion to palpation    Treatment: ST assessed pt's swallow at bedside with pt only taking 3 small sips of water and 1 small bite of puree.  She did exhibit coughing and throat clearing with intake of thin liquids indicating likely supraglottic penetration vs aspiration.  Pt refused further intake.  ST will reassess pt's swallow  when she is agreeable.     Assessment:     Veronica Sue is a 80 y.o. female with an SLP diagnosis of Dysphagia.  She presents with decreased swallow safety and increased aspiration risk.  Pt will benefit from ongoing ST per POC to establish safety diet.     Goals:   Multidisciplinary Problems     SLP Goals        Problem: SLP Goal    Goal Priority Disciplines Outcome   SLP Goal     SLP Ongoing, Progressing   Description:  1. Pt will tolerate least restrictive diet without incidence while utilizing safe swallow strategies with mod A  2. Pt will complete oral and pharyngeal ex's with min A for increased strength and ROM                      Plan:     · Patient to be seen:  3 x/week   · Plan of Care expires:  04/20/20  · Plan of Care reviewed with:  patient   · SLP Follow-Up:  Yes      Time Tracking:     SLP Treatment Date:   04/13/20  Speech Start Time:  1000  Speech Stop Time:  1015     Speech Total Time (min):  15 min    Billable Minutes: Eval Swallow and Oral Function 15    Shraddha Cabrera CCC-SLP  04/13/2020

## 2020-04-13 NOTE — PROGRESS NOTES
[facility] Hospital Medicine  Progress Note    Patient Name: Veronica Sue  MRN: [id number]  Patient Class: IP- Inpatient   Admission Date: 4/11/2020  Length of Stay: 2 days  Attending Physician: Claudia Jewell MD  Primary Care Provider: Primary Doctor No        Subjective:     Principal Problem:Encephalopathy, metabolic        HPI:  Brought in to the Emergency Department because of weakness. PMHx significant for A.fib, Cerebellar stroke, CAD, CKD III, DM 2, Diastolic HF/ HFpEF, DVT left peroneal vein.   Fell several times at home.  Weak and unable to keep balance.  Getting worse over the last week.  Also having general body aches and started nausea and vomiting tonight.  No problems urinating but is having bad constipation for some time.  No cough, shortness of breath, or chest pain.  Denies fevers or chills.  No known recent sick contacts.  No medicaiton changes. Family reports persistent nausea , decreased appetite and  significant weight loss nearly 15 to 20 pounds in the past month.      Overview/Hospital Course:  4/13- Pt remains very weak in general . She is awake , oriented to self. Speech is slow and does not make eye contact. Fine tremor is noted ria hands This morning per nursing staff , pt did not want to eat or drink. Speech therapy is consulted for swallow eval.  IR consult is placed for CT guided liver biopsy and will be performed on Wednesday . Eliquis is on hold in that regard . Hem/Onc also consulted . Labs resulted Hb 10.5, Pl clumped , creatinine down to 1.4, Calcium down to 10.9 , Intact PTH 5.9, AFP 34645, CA 19-9 67,   15.     Interval History:   Pt remains very weak in general . She is awake , oriented to self. Speech is slow and does not make eye contact. Fine tremor is noted ria hands This morning per nursing staff , pt did not want to eat or drink. Speech therapy is consulted for swallow eval.  IR consult is placed for CT guided liver biopsy and will be performed  on Wednesday . Eliquis is on hold in that regard . Hem/Onc also consulted . Labs resulted Hb 10.5, Pl clumped , creatinine down to 1.4, Calcium down to 10.9 , Intact PTH 5.9, AFP 98397, CA 19-9 67,   15      Review of Systems   Constitutional: Positive for activity change, appetite change, fatigue and unexpected weight change (20 pounds in the last month ). Negative for fever.   HENT: Negative for sore throat.    Eyes: Negative for visual disturbance.   Respiratory: Negative for cough, chest tightness and shortness of breath.    Cardiovascular: Negative for chest pain, palpitations and leg swelling.   Gastrointestinal: Positive for constipation and nausea. Negative for abdominal distention, abdominal pain, diarrhea and vomiting.   Endocrine: Negative for polyuria.   Genitourinary: Negative for decreased urine volume, dysuria, flank pain, frequency and hematuria.   Musculoskeletal: Positive for gait problem. Negative for back pain.   Skin: Negative for rash.   Neurological: Positive for tremors. Negative for syncope, speech difficulty, weakness, light-headedness and headaches.   Psychiatric/Behavioral: Positive for confusion. Negative for hallucinations and sleep disturbance.     Objective:     Vital Signs (Most Recent):  Temp: 97.8 °F (36.6 °C) (04/13/20 0707)  Pulse: 71 (04/13/20 0707)  Resp: 18 (04/13/20 0707)  BP: (!) 160/70 (04/13/20 0707)  SpO2: 98 % (04/13/20 0707) Vital Signs (24h Range):  Temp:  [97.4 °F (36.3 °C)-98.8 °F (37.1 °C)] 97.8 °F (36.6 °C)  Pulse:  [60-90] 71  Resp:  [14-22] 18  SpO2:  [94 %-98 %] 98 %  BP: (131-186)/(63-90) 160/70     Weight: 85.5 kg (188 lb 7.9 oz)  Body mass index is 31.37 kg/m².    Intake/Output Summary (Last 24 hours) at 4/13/2020 1123  Last data filed at 4/13/2020 0932  Gross per 24 hour   Intake 1488.33 ml   Output 650 ml   Net 838.33 ml      Physical Exam    Significant Labs:   CBC:   Recent Labs   Lab 04/11/20  1940/20  0430 04/13/20  0515   WBC 6.31 9.46 6.58    HGB 12.6 11.9* 10.5*   HCT 40.7 38.1 33.5*    309 87*     CMP:   Recent Labs   Lab 04/11/20  1940/20  0430 04/13/20  0515    140  140 141   K 4.5 3.9  3.9 3.8    109  109 109   CO2 24 19*  19* 20*   * 325*  325* 253*   BUN 23 20  20 18   CREATININE 2.0* 1.5*  1.5* 1.4   CALCIUM 14.1* 13.3*  13.3* 10.9*   PROT 7.8 7.2 6.4   ALBUMIN 3.7 3.4*  3.4* 3.1*   BILITOT 0.6 0.5 0.5   ALKPHOS 259* 242* 184*   AST 96* 86* 68*   ALT 71* 67* 50*   ANIONGAP 11 12  12 12   EGFRNONAA 23.1* 33*  33* 36*       Significant Imaging: U/S abd -     1.  Abnormal study.  There are too numerous to count hepatic masses measuring up to 3.1 cm, concerning for metastasis and less likely multiple focal primary neoplasm.    2.  Borderline splenomegaly.  The splenic calcifications, possibly related to old granulomatous disease.    3.  Small left kidney with cortical thinning.  Medical renal disease could have this appearance.  Clinical correlation is advised.          Assessment/Plan:      * Encephalopathy, metabolic  - Related to severe hypercalcemia   -Treated with volume expansion with NS and Lasix added to enhance renal excretion of calcium         Accelerated hypertension  - Initially required Cardene ggt and currently weaned off   -Resume home antihypertensives       Hypercalcemia of malignancy  - Intact PTH is suppressed   -PTHrP is pending   -U/S abd resulted numerous hepatic masses and elevated AFP   -Treated with volume expansion with NS and Lasix to enhance renal excretion of calcium   -May need Bisphosphonate therapy   -Consult Hem/Oncology       Hepatic lesions / masses   - IR consult for CT guided liver biopsy . Will be performed on 4/15/20   -Consult Hem/Oncology       Disorder of electrolytes  - Hypercalcemia treated with volume expansion with NS and Lasix for renal excretion of calcium   -Hypomagnesemia and hypophosphatemia noted on admit and replete accordingly        Acute cystitis  without hematuria  - Started on Rocephin   -Follow urine culture       CKD (chronic kidney disease) stage 4, GFR 15-29 ml/min  - Monitor renal indices       DVT, lower extremity  - On chronic anticoagulation with Eliquis       Diabetes  - ISS       Diastolic heart failure  - Currently compensated       Bacteremia - One of two blood cultures resulted Gram positive cocci in clusters resembling Staph . Possibly contaminant but started on Vancomycin until final identification and sensitivity .         VTE Risk Mitigation (From admission, onward)    None                Nina Pink MD  Department of Hospital Medicine   [facility]

## 2020-04-13 NOTE — CONSULTS
[facility] -   Hematology/Oncology  Consult Note    Patient Name: Veronica Sue  MRN: [id number]  Admission Date: 4/11/2020  Hospital Length of Stay: 2 days  Code Status: Full Code   Attending Provider: Claudia Jewell MD  Consulting Provider: Winnie Huitron NP  Primary Care Physician: Primary Doctor No  Principal Problem:Encephalopathy, metabolic    Inpatient consult to Hematology/Oncology  Consult performed by: Winnie Huitron NP  Consult ordered by: Nina Pink MD  Reason for consult: Liver Lesions  Assessment/Recommendations: Hepatic lesions / masses   TMTC hypodense liver lesions on US, unable to complete CT due to poor kidney function. Plan is for biopsy per IR on Wednesday, Eliquis has been held to prepare for procedure.     --Continue supportive care  --Will review results when available    CKD (chronic kidney disease) stage 4, GFR 15-29 ml/min  Management per Nephrology    Hypercalcemia of malignancy  Hypercalcemia likely related to malignancy, PTHrp pending, PTH low. TMTC hypodense liver lesions seen on US, unable to complete CT scan due to poor kidney function. Plan is for liver biopsy per IR on Wednesday, Eliquis has been held to prepare for procedure. Depending on biopsy results will determine prognosis and treatment options. Treatment options will likely be limited due to patient overall condition or comorbid diagnoses. Calcitonin given on 4/11/2020, patient can discharge on intranasal calcitonin if needed.     --Will review pathology results when available and discuss results and prognosis with family at that time  --Continue supportive care  --Daily CBC, CMP          Subjective:     HPI:  80 year old female with arthritis, atrial fibrillation, CHF, CKD 4, CAD, DM2, LE DVT, HTN presents to [facility] [place] with weakness, nausea and vomiting, constipation.   Work-up revealed severe hypercalcemia and numerous hypodense liver lesions, largest measures 3.1 cm, CT not completed due to impaired  renal function. Hem/Onc consulted due to lever lesions and hypercalcemia. Patient is scheduled for liver biopsy with IR for Wednesday.     Oncology Treatment Plan:   [No treatment plan]    Medications:  Continuous Infusions:  Scheduled Meds:   ALPRAZolam  0.5 mg Oral BID    amiodarone  200 mg Oral Daily    cefTRIAXone (ROCEPHIN) IVPB  1 g Intravenous Q24H    doxazosin  4 mg Oral Daily    hydrALAZINE  50 mg Oral BID    levothyroxine  88 mcg Oral Before breakfast    metoprolol succinate  25 mg Oral Daily    NIFEdipine  90 mg Oral Daily    pantoprazole  40 mg Oral Daily    polyethylene glycol  17 g Oral Daily    simvastatin  40 mg Oral QHS     PRN Meds:dextrose 10 % in water (D10W), dextrose 10 % in water (D10W), glucagon (human recombinant), glucose, glucose, hydrALAZINE, insulin aspart U-100, ondansetron, ondansetron, sodium chloride 0.9%, Pharmacy to dose Vancomycin consult **AND** vancomycin - pharmacy to dose     Review of patient's allergies indicates:   Allergen Reactions    Iodinated contrast media Swelling     Taken amiodorone    Levofloxacin     Minoxidil     Rosiglitazone         Past Medical History:   Diagnosis Date    Anticoagulant long-term use     Anxiety     Arthritis     Atrial fibrillation     CHF (congestive heart failure)     CKD (chronic kidney disease) stage 4, GFR 15-29 ml/min     Coronary artery disease     Diabetes     Diastolic heart failure     DVT, lower extremity, left peroneal vein     GERD (gastroesophageal reflux disease)     Hypertension      Past Surgical History:   Procedure Laterality Date    APPENDECTOMY      BREAST BIOPSY      CARDIAC CATHETERIZATION      HYSTERECTOMY      TONSILLECTOMY       Family History     Problem Relation (Age of Onset)    Heart disease Mother, Father, Sister, Brother    Stroke Sister        Tobacco Use    Smoking status: Never Smoker   Substance and Sexual Activity    Alcohol use: Not Currently    Drug use: Not on file     Sexual activity: Not on file       Review of Systems   Unable to perform ROS: Mental status change     Objective:     Vital Signs (Most Recent):  Temp: 97.2 °F (36.2 °C) (04/13/20 1217)  Pulse: 65 (04/13/20 1217)  Resp: 18 (04/13/20 1217)  BP: (!) 134/99 (04/13/20 1217)  SpO2: 98 % (04/13/20 1217) Vital Signs (24h Range):  Temp:  [97.2 °F (36.2 °C)-98.8 °F (37.1 °C)] 97.2 °F (36.2 °C)  Pulse:  [60-90] 65  Resp:  [14-21] 18  SpO2:  [94 %-98 %] 98 %  BP: (131-186)/(63-99) 134/99     Weight: 85.5 kg (188 lb 7.9 oz)  Body mass index is 31.37 kg/m².  Body surface area is 1.98 meters squared.      Intake/Output Summary (Last 24 hours) at 4/13/2020 1312  Last data filed at 4/13/2020 0932  Gross per 24 hour   Intake 1488.33 ml   Output 650 ml   Net 838.33 ml       Physical Exam   Constitutional: She appears cachectic. No distress.   HENT:   Head: Normocephalic and atraumatic.   Right Ear: Hearing and external ear normal.   Left Ear: Hearing and external ear normal.   Nose: No rhinorrhea or sinus tenderness. Right sinus exhibits no maxillary sinus tenderness and no frontal sinus tenderness. Left sinus exhibits no maxillary sinus tenderness and no frontal sinus tenderness.   Mouth/Throat: Uvula is midline, oropharynx is clear and moist and mucous membranes are normal. No oral lesions.   Eyes: Pupils are equal, round, and reactive to light. Conjunctivae are normal. Right eye exhibits no discharge. Left eye exhibits no discharge.   Neck: Normal range of motion. Carotid bruit is not present. No tracheal deviation present. No thyromegaly present.   Cardiovascular: Normal rate, regular rhythm, S1 normal, S2 normal, normal heart sounds and intact distal pulses.   No murmur heard.  Pulses:       Dorsalis pedis pulses are 2+ on the right side, and 2+ on the left side.   Pulmonary/Chest: Effort normal and breath sounds normal. No respiratory distress.   Abdominal: Soft. Bowel sounds are normal. She exhibits distension. She exhibits no  mass. There is no tenderness.   Musculoskeletal: Normal range of motion. She exhibits no edema.   Lymphadenopathy:     She has no cervical adenopathy.        Right: No supraclavicular adenopathy present.        Left: No supraclavicular adenopathy present.   Neurological: She is alert. She has normal strength. No sensory deficit. Coordination and gait normal.   Skin: Skin is warm and dry. Capillary refill takes less than 2 seconds. No rash noted. There is pallor.   Psychiatric: Her speech is normal and behavior is normal. Judgment and thought content normal. Cognition and memory are impaired. She exhibits a depressed mood.   Nursing note and vitals reviewed.      Significant Labs:   CBC:   Recent Labs   Lab 04/1940 04/12/20 0430 04/13/20  0515   WBC 6.31 9.46 6.58   HGB 12.6 11.9* 10.5*   HCT 40.7 38.1 33.5*    309 87*    and CMP:   Recent Labs   Lab 04/1940 04/12/20 0430 04/13/20  0515    140  140 141   K 4.5 3.9  3.9 3.8    109  109 109   CO2 24 19*  19* 20*   * 325*  325* 253*   BUN 23 20  20 18   CREATININE 2.0* 1.5*  1.5* 1.4   CALCIUM 14.1* 13.3*  13.3* 10.9*   PROT 7.8 7.2 6.4   ALBUMIN 3.7 3.4*  3.4* 3.1*   BILITOT 0.6 0.5 0.5   ALKPHOS 259* 242* 184*   AST 96* 86* 68*   ALT 71* 67* 50*   ANIONGAP 11 12  12 12   EGFRNONAA 23.1* 33*  33* 36*       Diagnostic Results:  I have reviewed all pertinent imaging results/findings within the past 24 hours.    Assessment/Plan:     Hepatic lesions / masses   TMTC hypodense liver lesions on US, unable to complete CT due to poor kidney function. Plan is for biopsy per IR on Wednesday, Eliquis has been held to prepare for procedure.     --Continue supportive care  --Will review results when available    CKD (chronic kidney disease) stage 4, GFR 15-29 ml/min  Management per Nephrology    Hypercalcemia of malignancy  Hypercalcemia likely related to malignancy, PTHrp pending, PTH low. TMTC hypodense liver lesions seen on US,  unable to complete CT scan due to poor kidney function. Plan is for liver biopsy per IR on Wednesday, Eliquis has been held to prepare for procedure. Depending on biopsy results will determine prognosis and treatment options. Treatment options will likely be limited due to patient overall condition or comorbid diagnoses. Calcitonin given on 4/11/2020, patient can discharge on intranasal calcitonin if needed.     --Will review pathology results when available and discuss results and prognosis with family at that time  --Continue supportive care  --Daily CBC, CMP        Thank you for your consult. I will follow-up with patient. Please contact us if you have any additional questions.    Winnie Huitron NP  Hematology/Oncology  [facility]

## 2020-04-13 NOTE — HPI
Brought in to the Emergency Department because of weakness. PMHx significant for A.fib, Cerebellar stroke, CAD, CKD III, DM 2, Diastolic HF/ HFpEF, DVT left peroneal vein.   Fell several times at home.  Weak and unable to keep balance.  Getting worse over the last week.  Also having general body aches and started nausea and vomiting tonight.  No problems urinating but is having bad constipation for some time.  No cough, shortness of breath, or chest pain.  Denies fevers or chills.  No known recent sick contacts.  No medicaiton changes. Family reports persistent nausea , decreased appetite and  significant weight loss nearly 15 to 20 pounds in the past month.

## 2020-04-13 NOTE — PROGRESS NOTES
Pharmacokinetic Initial Assessment: IV Vancomycin    Assessment/Plan:    Initiate intravenous vancomycin with loading dose of 2250 mg once with subsequent doses when random concentrations are less than 20 mcg/mL  Desired empiric serum trough concentration is 15 to 20 mcg/mL  Draw vancomycin random level on 4/14 at 04:30.  Pharmacy will continue to follow and monitor vancomycin.      Please contact pharmacy at extension [phone] with any questions regarding this assessment.     Thank you for the consult,   Gerry Izquierdo       Patient brief summary:  Veronica Sue is a 80 y.o. female initiated on antimicrobial therapy with IV Vancomycin for treatment of suspected bacteremia    Drug Allergies:   Review of patient's allergies indicates:   Allergen Reactions    Iodinated contrast media Swelling     Taken amiodorone    Levofloxacin     Minoxidil     Rosiglitazone        Actual Body Weight:   85.5kg    Renal Function:   Estimated Creatinine Clearance: 32.3 mL/min (A) (based on SCr of 1.5 mg/dL (H)).,     Dialysis Method (if applicable):  N/A    CBC (last 72 hours):  Recent Labs   Lab Result Units 04/1940 04/12/20  0430   WBC K/uL 6.31 9.46   Hemoglobin g/dL 12.6 11.9*   Hemoglobin A1C %  --  7.4*   Hematocrit % 40.7 38.1   Platelets K/uL 275 309   Gran% % 67.6 82.4*   Lymph% % 19.5 9.7*   Mono% % 11.3 7.3   Eosinophil% % 1.1 0.1   Basophil% % 0.2 0.2   Differential Method  Automated Automated       Metabolic Panel (last 72 hours):  Recent Labs   Lab Result Units 04/1940 04/11/20 1945 04/12/20  0430   Sodium mmol/L 138  --  140  140   Potassium mmol/L 4.5  --  3.9  3.9   Chloride mmol/L 103  --  109  109   CO2 mmol/L 24  --  19*  19*   Glucose mg/dL 359*  --  325*  325*   Glucose, UA   --  1+*  --    BUN, Bld mg/dL 23  --  20  20   Creatinine mg/dL 2.0*  --  1.5*  1.5*   Albumin g/dL 3.7  --  3.4*  3.4*   Total Bilirubin mg/dL 0.6  --  0.5   Alkaline Phosphatase U/L 259*  --  242*   AST U/L 96*  --  86*    ALT U/L 71*  --  67*   Magnesium mg/dL  --   --  1.5*   Phosphorus mg/dL  --   --  1.5*       Drug levels (last 3 results):  No results for input(s): VANCOMYCINRA, VANCOMYCINPE, VANCOMYCINTR in the last 72 hours.    Microbiologic Results:  Microbiology Results (last 7 days)       Procedure Component Value Units Date/Time    Blood culture #2 **CANNOT BE ORDERED STAT** [[id number]] Collected:  04/11/20 2108    Order Status:  Completed Specimen:  Blood from Peripheral, Antecubital, Right Updated:  04/13/20 0450     Blood Culture, Routine Gram stain aer bottle: Gram positive cocci in clusters resembling Staph       Results called to and read back by:Delilah Link RN 04/13/2020  04:49    Blood culture #1 **CANNOT BE ORDERED STAT** [[id number]] Collected:  04/11/20 2103    Order Status:  Completed Specimen:  Blood from Peripheral, Forearm, Left Updated:  04/12/20 1115     Blood Culture, Routine No Growth to date    Influenza A & B by Molecular [[id number]] Collected:  04/11/20 1941    Order Status:  Completed Specimen:  Nasopharyngeal Swab Updated:  04/11/20 2017     Influenza A, Molecular Negative     Influenza B, Molecular Negative     Flu A & B Source Nasal swab    Urine culture [[id number]] Collected:  04/11/20 1945    Order Status:  No result Specimen:  Urine Updated:  04/11/20 2016

## 2020-04-13 NOTE — ASSESSMENT & PLAN NOTE
Hypercalcemia likely related to malignancy, PTHrp pending, PTH low. TMTC hypodense liver lesions seen on US, unable to complete CT scan due to poor kidney function. Plan is for liver biopsy per IR on Wednesday, Eliquis has been held to prepare for procedure. Depending on biopsy results will determine prognosis and treatment options. Treatment options will likely be limited due to patient overall condition or comorbid diagnoses. Calcitonin given on 4/11/2020, patient can discharge on intranasal calcitonin if needed.     --Will review pathology results when available and discuss results and prognosis with family at that time  --Continue supportive care  --Daily CBC, CMP

## 2020-04-13 NOTE — ASSESSMENT & PLAN NOTE
- Related to severe hypercalcemia   -Treated with volume expansion with NS and Lasix added to enhance renal excretion of calcium

## 2020-04-13 NOTE — HOSPITAL COURSE
4/13- Pt remains very weak in general . She is awake , oriented to self. Speech is slow and does not make eye contact. Fine tremor is noted ria hands This morning per nursing staff , pt did not want to eat or drink. Speech therapy is consulted for swallow eval.  IR consult is placed for CT guided liver biopsy and will be performed on Wednesday . Eliquis is on hold in that regard . Hem/Onc also consulted . Labs resulted Hb 10.5, Pl clumped , creatinine down to 1.4, Calcium down to 10.9 , Intact PTH 5.9, AFP 52731, CA 19-9 67,   15.   4/14- Pt is seen at bedside , resting comfortable . She is awake , oriented to self , person and place today . States she feels little bit better. Serum calcium is rising . Discussed case with Nephro and approved Zoledronic acid 4 mg IV x 1 dose given serum creatinine 1.7 today . Plan for liver biopsy tomorrow still in place . Off of eliquis   4/15- Pt underwent CT guided Liver biopsy by IR . There was no immediate complications. Per family wish decision was made to discharge pt home today post procedure with family care and set up Hospice care as outpatient when family is ready for the service . They wish to keep first Oncology visit in the clinic to discuss pathology report and next plan of action based on biopsy report.

## 2020-04-13 NOTE — SUBJECTIVE & OBJECTIVE
Oncology Treatment Plan:   [No treatment plan]    Medications:  Continuous Infusions:  Scheduled Meds:   ALPRAZolam  0.5 mg Oral BID    amiodarone  200 mg Oral Daily    cefTRIAXone (ROCEPHIN) IVPB  1 g Intravenous Q24H    doxazosin  4 mg Oral Daily    hydrALAZINE  50 mg Oral BID    levothyroxine  88 mcg Oral Before breakfast    metoprolol succinate  25 mg Oral Daily    NIFEdipine  90 mg Oral Daily    pantoprazole  40 mg Oral Daily    polyethylene glycol  17 g Oral Daily    simvastatin  40 mg Oral QHS     PRN Meds:dextrose 10 % in water (D10W), dextrose 10 % in water (D10W), glucagon (human recombinant), glucose, glucose, hydrALAZINE, insulin aspart U-100, ondansetron, ondansetron, sodium chloride 0.9%, Pharmacy to dose Vancomycin consult **AND** vancomycin - pharmacy to dose     Review of patient's allergies indicates:   Allergen Reactions    Iodinated contrast media Swelling     Taken amiodorone    Levofloxacin     Minoxidil     Rosiglitazone         Past Medical History:   Diagnosis Date    Anticoagulant long-term use     Anxiety     Arthritis     Atrial fibrillation     CHF (congestive heart failure)     CKD (chronic kidney disease) stage 4, GFR 15-29 ml/min     Coronary artery disease     Diabetes     Diastolic heart failure     DVT, lower extremity, left peroneal vein     GERD (gastroesophageal reflux disease)     Hypertension      Past Surgical History:   Procedure Laterality Date    APPENDECTOMY      BREAST BIOPSY      CARDIAC CATHETERIZATION      HYSTERECTOMY      TONSILLECTOMY       Family History     Problem Relation (Age of Onset)    Heart disease Mother, Father, Sister, Brother    Stroke Sister        Tobacco Use    Smoking status: Never Smoker   Substance and Sexual Activity    Alcohol use: Not Currently    Drug use: Not on file    Sexual activity: Not on file       Review of Systems   Unable to perform ROS: Mental status change     Objective:     Vital Signs (Most  Recent):  Temp: 97.2 °F (36.2 °C) (04/13/20 1217)  Pulse: 65 (04/13/20 1217)  Resp: 18 (04/13/20 1217)  BP: (!) 134/99 (04/13/20 1217)  SpO2: 98 % (04/13/20 1217) Vital Signs (24h Range):  Temp:  [97.2 °F (36.2 °C)-98.8 °F (37.1 °C)] 97.2 °F (36.2 °C)  Pulse:  [60-90] 65  Resp:  [14-21] 18  SpO2:  [94 %-98 %] 98 %  BP: (131-186)/(63-99) 134/99     Weight: 85.5 kg (188 lb 7.9 oz)  Body mass index is 31.37 kg/m².  Body surface area is 1.98 meters squared.      Intake/Output Summary (Last 24 hours) at 4/13/2020 1312  Last data filed at 4/13/2020 0932  Gross per 24 hour   Intake 1488.33 ml   Output 650 ml   Net 838.33 ml       Physical Exam   Constitutional: She appears cachectic. No distress.   HENT:   Head: Normocephalic and atraumatic.   Right Ear: Hearing and external ear normal.   Left Ear: Hearing and external ear normal.   Nose: No rhinorrhea or sinus tenderness. Right sinus exhibits no maxillary sinus tenderness and no frontal sinus tenderness. Left sinus exhibits no maxillary sinus tenderness and no frontal sinus tenderness.   Mouth/Throat: Uvula is midline, oropharynx is clear and moist and mucous membranes are normal. No oral lesions.   Eyes: Pupils are equal, round, and reactive to light. Conjunctivae are normal. Right eye exhibits no discharge. Left eye exhibits no discharge.   Neck: Normal range of motion. Carotid bruit is not present. No tracheal deviation present. No thyromegaly present.   Cardiovascular: Normal rate, regular rhythm, S1 normal, S2 normal, normal heart sounds and intact distal pulses.   No murmur heard.  Pulses:       Dorsalis pedis pulses are 2+ on the right side, and 2+ on the left side.   Pulmonary/Chest: Effort normal and breath sounds normal. No respiratory distress.   Abdominal: Soft. Bowel sounds are normal. She exhibits distension. She exhibits no mass. There is no tenderness.   Musculoskeletal: Normal range of motion. She exhibits no edema.   Lymphadenopathy:     She has no  cervical adenopathy.        Right: No supraclavicular adenopathy present.        Left: No supraclavicular adenopathy present.   Neurological: She is alert. She has normal strength. No sensory deficit. Coordination and gait normal.   Skin: Skin is warm and dry. Capillary refill takes less than 2 seconds. No rash noted. There is pallor.   Psychiatric: Her speech is normal and behavior is normal. Judgment and thought content normal. Cognition and memory are impaired. She exhibits a depressed mood.   Nursing note and vitals reviewed.      Significant Labs:   CBC:   Recent Labs   Lab 04/1940 04/12/20 0430 04/13/20  0515   WBC 6.31 9.46 6.58   HGB 12.6 11.9* 10.5*   HCT 40.7 38.1 33.5*    309 87*    and CMP:   Recent Labs   Lab 04/1940 04/12/20 0430 04/13/20  0515    140  140 141   K 4.5 3.9  3.9 3.8    109  109 109   CO2 24 19*  19* 20*   * 325*  325* 253*   BUN 23 20  20 18   CREATININE 2.0* 1.5*  1.5* 1.4   CALCIUM 14.1* 13.3*  13.3* 10.9*   PROT 7.8 7.2 6.4   ALBUMIN 3.7 3.4*  3.4* 3.1*   BILITOT 0.6 0.5 0.5   ALKPHOS 259* 242* 184*   AST 96* 86* 68*   ALT 71* 67* 50*   ANIONGAP 11 12  12 12   EGFRNONAA 23.1* 33*  33* 36*       Diagnostic Results:  I have reviewed all pertinent imaging results/findings within the past 24 hours.

## 2020-04-14 LAB
ALBUMIN SERPL BCP-MCNC: 2.8 G/DL (ref 3.5–5.2)
ALP SERPL-CCNC: 160 U/L (ref 55–135)
ALT SERPL W/O P-5'-P-CCNC: 39 U/L (ref 10–44)
ANION GAP SERPL CALC-SCNC: 9 MMOL/L (ref 8–16)
AST SERPL-CCNC: 58 U/L (ref 10–40)
BACTERIA UR CULT: ABNORMAL
BASOPHILS # BLD AUTO: 0.03 K/UL (ref 0–0.2)
BASOPHILS NFR BLD: 0.4 % (ref 0–1.9)
BILIRUB SERPL-MCNC: 0.5 MG/DL (ref 0.1–1)
BUN SERPL-MCNC: 23 MG/DL (ref 8–23)
CALCIUM SERPL-MCNC: 11.3 MG/DL (ref 8.7–10.5)
CHLORIDE SERPL-SCNC: 108 MMOL/L (ref 95–110)
CO2 SERPL-SCNC: 24 MMOL/L (ref 23–29)
CREAT SERPL-MCNC: 1.7 MG/DL (ref 0.5–1.4)
DIFFERENTIAL METHOD: ABNORMAL
EOSINOPHIL # BLD AUTO: 0 K/UL (ref 0–0.5)
EOSINOPHIL NFR BLD: 0.4 % (ref 0–8)
ERYTHROCYTE [DISTWIDTH] IN BLOOD BY AUTOMATED COUNT: 14.8 % (ref 11.5–14.5)
EST. GFR  (AFRICAN AMERICAN): 32 ML/MIN/1.73 M^2
EST. GFR  (NON AFRICAN AMERICAN): 28 ML/MIN/1.73 M^2
GLUCOSE SERPL-MCNC: 218 MG/DL (ref 70–110)
HCT VFR BLD AUTO: 31 % (ref 37–48.5)
HGB BLD-MCNC: 10 G/DL (ref 12–16)
IMM GRANULOCYTES # BLD AUTO: 0.02 K/UL (ref 0–0.04)
IMM GRANULOCYTES NFR BLD AUTO: 0.3 % (ref 0–0.5)
LYMPHOCYTES # BLD AUTO: 0.9 K/UL (ref 1–4.8)
LYMPHOCYTES NFR BLD: 12.3 % (ref 18–48)
MAGNESIUM SERPL-MCNC: 1.8 MG/DL (ref 1.6–2.6)
MCH RBC QN AUTO: 29.3 PG (ref 27–31)
MCHC RBC AUTO-ENTMCNC: 32.3 G/DL (ref 32–36)
MCV RBC AUTO: 91 FL (ref 82–98)
MONOCYTES # BLD AUTO: 0.8 K/UL (ref 0.3–1)
MONOCYTES NFR BLD: 10 % (ref 4–15)
NEUTROPHILS # BLD AUTO: 5.8 K/UL (ref 1.8–7.7)
NEUTROPHILS NFR BLD: 76.6 % (ref 38–73)
NRBC BLD-RTO: 0 /100 WBC
PHOSPHATE SERPL-MCNC: 2.7 MG/DL (ref 2.7–4.5)
PLATELET # BLD AUTO: 208 K/UL (ref 150–350)
PLATELET BLD QL SMEAR: ABNORMAL
PMV BLD AUTO: 11 FL (ref 9.2–12.9)
POCT GLUCOSE: 179 MG/DL (ref 70–110)
POCT GLUCOSE: 181 MG/DL (ref 70–110)
POCT GLUCOSE: 212 MG/DL (ref 70–110)
POCT GLUCOSE: 306 MG/DL (ref 70–110)
POTASSIUM SERPL-SCNC: 3.4 MMOL/L (ref 3.5–5.1)
PROT SERPL-MCNC: 6 G/DL (ref 6–8.4)
RBC # BLD AUTO: 3.41 M/UL (ref 4–5.4)
SODIUM SERPL-SCNC: 141 MMOL/L (ref 136–145)
VANCOMYCIN SERPL-MCNC: 15.8 UG/ML
WBC # BLD AUTO: 7.59 K/UL (ref 3.9–12.7)

## 2020-04-14 PROCEDURE — 94760 N-INVAS EAR/PLS OXIMETRY 1: CPT

## 2020-04-14 PROCEDURE — 99233 SBSQ HOSP IP/OBS HIGH 50: CPT | Mod: ,,, | Performed by: INTERNAL MEDICINE

## 2020-04-14 PROCEDURE — 25000003 PHARM REV CODE 250: Performed by: NURSE PRACTITIONER

## 2020-04-14 PROCEDURE — 63700000 PHARM REV CODE 250 ALT 637 W/O HCPCS: Performed by: INTERNAL MEDICINE

## 2020-04-14 PROCEDURE — 25000003 PHARM REV CODE 250: Performed by: INTERNAL MEDICINE

## 2020-04-14 PROCEDURE — 84100 ASSAY OF PHOSPHORUS: CPT

## 2020-04-14 PROCEDURE — 92526 ORAL FUNCTION THERAPY: CPT

## 2020-04-14 PROCEDURE — 80053 COMPREHEN METABOLIC PANEL: CPT

## 2020-04-14 PROCEDURE — 97535 SELF CARE MNGMENT TRAINING: CPT

## 2020-04-14 PROCEDURE — 63600175 PHARM REV CODE 636 W HCPCS: Performed by: NURSE PRACTITIONER

## 2020-04-14 PROCEDURE — 80202 ASSAY OF VANCOMYCIN: CPT

## 2020-04-14 PROCEDURE — 11000001 HC ACUTE MED/SURG PRIVATE ROOM

## 2020-04-14 PROCEDURE — 83735 ASSAY OF MAGNESIUM: CPT

## 2020-04-14 PROCEDURE — 96372 THER/PROPH/DIAG INJ SC/IM: CPT

## 2020-04-14 PROCEDURE — 97530 THERAPEUTIC ACTIVITIES: CPT

## 2020-04-14 PROCEDURE — 63600175 PHARM REV CODE 636 W HCPCS: Performed by: INTERNAL MEDICINE

## 2020-04-14 PROCEDURE — 21400001 HC TELEMETRY ROOM

## 2020-04-14 PROCEDURE — 99233 PR SUBSEQUENT HOSPITAL CARE,LEVL III: ICD-10-PCS | Mod: ,,, | Performed by: INTERNAL MEDICINE

## 2020-04-14 PROCEDURE — 92507 TX SP LANG VOICE COMM INDIV: CPT

## 2020-04-14 PROCEDURE — 85025 COMPLETE CBC W/AUTO DIFF WBC: CPT

## 2020-04-14 RX ORDER — ZOLEDRONIC ACID 0.04 MG/ML
4 INJECTION, SOLUTION INTRAVENOUS
Status: DISCONTINUED | OUTPATIENT
Start: 2020-04-14 | End: 2020-04-14

## 2020-04-14 RX ORDER — FUROSEMIDE 10 MG/ML
20 INJECTION INTRAMUSCULAR; INTRAVENOUS ONCE
Status: COMPLETED | OUTPATIENT
Start: 2020-04-14 | End: 2020-04-14

## 2020-04-14 RX ORDER — ZOLEDRONIC ACID 0.04 MG/ML
4 INJECTION, SOLUTION INTRAVENOUS
Status: COMPLETED | OUTPATIENT
Start: 2020-04-14 | End: 2020-04-14

## 2020-04-14 RX ORDER — POTASSIUM CHLORIDE 20 MEQ/15ML
40 SOLUTION ORAL ONCE
Status: COMPLETED | OUTPATIENT
Start: 2020-04-14 | End: 2020-04-14

## 2020-04-14 RX ORDER — SODIUM CHLORIDE 9 MG/ML
INJECTION, SOLUTION INTRAVENOUS CONTINUOUS
Status: DISCONTINUED | OUTPATIENT
Start: 2020-04-14 | End: 2020-04-15

## 2020-04-14 RX ADMIN — POTASSIUM CHLORIDE 40 MEQ: 20 SOLUTION ORAL at 09:04

## 2020-04-14 RX ADMIN — NIFEDIPINE 90 MG: 30 TABLET, FILM COATED, EXTENDED RELEASE ORAL at 08:04

## 2020-04-14 RX ADMIN — SODIUM CHLORIDE: 0.9 INJECTION, SOLUTION INTRAVENOUS at 05:04

## 2020-04-14 RX ADMIN — INSULIN ASPART 2 UNITS: 100 INJECTION, SOLUTION INTRAVENOUS; SUBCUTANEOUS at 05:04

## 2020-04-14 RX ADMIN — SIMVASTATIN 40 MG: 20 TABLET, FILM COATED ORAL at 09:04

## 2020-04-14 RX ADMIN — VANCOMYCIN HYDROCHLORIDE 1250 MG: 100 INJECTION, POWDER, LYOPHILIZED, FOR SOLUTION INTRAVENOUS at 09:04

## 2020-04-14 RX ADMIN — SODIUM CHLORIDE: 0.9 INJECTION, SOLUTION INTRAVENOUS at 03:04

## 2020-04-14 RX ADMIN — DOXAZOSIN 4 MG: 2 TABLET ORAL at 08:04

## 2020-04-14 RX ADMIN — METOPROLOL SUCCINATE 25 MG: 25 TABLET, EXTENDED RELEASE ORAL at 08:04

## 2020-04-14 RX ADMIN — PANTOPRAZOLE SODIUM 40 MG: 40 TABLET, DELAYED RELEASE ORAL at 08:04

## 2020-04-14 RX ADMIN — SODIUM CHLORIDE 250 ML: 0.9 INJECTION, SOLUTION INTRAVENOUS at 11:04

## 2020-04-14 RX ADMIN — CEFTRIAXONE 1 G: 1 INJECTION, SOLUTION INTRAVENOUS at 09:04

## 2020-04-14 RX ADMIN — ALPRAZOLAM 0.5 MG: 0.5 TABLET ORAL at 08:04

## 2020-04-14 RX ADMIN — ALPRAZOLAM 0.5 MG: 0.5 TABLET ORAL at 09:04

## 2020-04-14 RX ADMIN — ZOLEDRONIC ACID 4 MG: 0.04 INJECTION, SOLUTION INTRAVENOUS at 11:04

## 2020-04-14 RX ADMIN — HYDRALAZINE HYDROCHLORIDE 50 MG: 50 TABLET, FILM COATED ORAL at 09:04

## 2020-04-14 RX ADMIN — LEVOTHYROXINE SODIUM 88 MCG: 88 TABLET ORAL at 05:04

## 2020-04-14 RX ADMIN — POLYETHYLENE GLYCOL 3350 17 G: 17 POWDER, FOR SOLUTION ORAL at 08:04

## 2020-04-14 RX ADMIN — AMIODARONE HYDROCHLORIDE 200 MG: 200 TABLET ORAL at 08:04

## 2020-04-14 RX ADMIN — HYDRALAZINE HYDROCHLORIDE 10 MG: 20 INJECTION INTRAMUSCULAR; INTRAVENOUS at 12:04

## 2020-04-14 RX ADMIN — FUROSEMIDE 20 MG: 10 INJECTION, SOLUTION INTRAMUSCULAR; INTRAVENOUS at 06:04

## 2020-04-14 RX ADMIN — HYDRALAZINE HYDROCHLORIDE 50 MG: 50 TABLET, FILM COATED ORAL at 08:04

## 2020-04-14 RX ADMIN — INSULIN ASPART 4 UNITS: 100 INJECTION, SOLUTION INTRAVENOUS; SUBCUTANEOUS at 11:04

## 2020-04-14 NOTE — ASSESSMENT & PLAN NOTE
Hypercalcemia likely related to malignancy, PTHrp pending, PTH low. TMTC hypodense liver lesions seen on US, unable to complete CT scan due to poor kidney function. Plan is for liver biopsy per IR on Wednesday, Eliquis has been held to prepare for procedure. Depending on biopsy results will determine prognosis and treatment options. Treatment options will likely be limited due to patient overall condition or comorbid diagnoses. Calcitonin given on 4/11/2020, patient can discharge on intranasal calcitonin if needed.     --Will review pathology results when available and discuss results and prognosis with family at that time  --Continue supportive care  --Daily CBC, CMP  --Zoledronic Acid ordered per Nephrology

## 2020-04-14 NOTE — PROGRESS NOTES
Therapy with vancomycin complete and/or consult discontinued by provider.  Pharmacy will sign off, please re-consult as needed.    Thank you for allowing us to participate in this patient's care.    Dia Smith, PharmD 4/14/2020 3:38 PM

## 2020-04-14 NOTE — PT/OT/SLP PROGRESS
Physical Therapy  Treatment    Veronica Sue   MRN: [id number]   Admitting Diagnosis: Encephalopathy, metabolic    PT Received On: 04/14/20  PT Start Time: 0915     PT Stop Time: 0940    PT Total Time (min): 25 min       Billable Minutes:  Therapeutic Activity 25    Treatment Type: Treatment  PT/PTA: PT          General Precautions: Standard, fall  Orthopedic Precautions: N/A   Braces: N/A     Subjective:  Communicated with NURSE MELARA prior to session.  Pain/Comfort  Pain Rating 1: 0/10    Objective:   Patient found with: telemetry, peripheral IV    Functional Mobility:  Therapeutic Activities and Exercises:  PT FOUND SUPINE IN BED UPON ARRIVAL, VERY LETHARGIC, DIFFICULT TO AROUSE BUT FAIR EFFORT, SUP>SIT WITH MAXA, SEATED SCOOT TO EOB WITH MAXA, SIT>STAND WITH MAXA, PT INCONTINENT OF BOWEL IN BED, TOTAL ASSIST FOR CLEANING WHILE PT STOOD AS WELL AS FOR DONNING BRIEF, PT TOOK SEVERAL SMALL STEPS WITH RW AND MAXA TO TF FROM BED TO CHAIR, PT EDUCATED IN BLE THEREX TO PERFORM WHILE SEATED IN CHAIR, PT WASHED FACE WITH TOWEL AND COMBED HAIR WITH SET UP    AM-PAC 6 CLICK MOBILITY  How much help from another person does this patient currently need?   1 = Unable, Total/Dependent Assistance  2 = A lot, Maximum/Moderate Assistance  3 = A little, Minimum/Contact Guard/Supervision  4 = None, Modified [place]/Independent    Turning over in bed (including adjusting bedclothes, sheets and blankets)?: 2  Sitting down on and standing up from a chair with arms (e.g., wheelchair, bedside commode, etc.): 2  Moving from lying on back to sitting on the side of the bed?: 2  Moving to and from a bed to a chair (including a wheelchair)?: 2  Need to walk in hospital room?: 1  Climbing 3-5 steps with a railing?: 1  Basic Mobility Total Score: 10    AM-PAC Raw Score CMS G-Code Modifier Level of Impairment Assistance   6 % Total / Unable   7 - 9 CM 80 - 100% Maximal Assist   10 - 14 CL 60 - 80% Moderate Assist   15 - 19 CK 40 - 60%  Moderate Assist   20 - 22 CJ 20 - 40% Minimal Assist   23 CI 1-20% SBA / CGA   24 CH 0% Independent/ Mod I     Patient left up in chair with all lines intact, call button in reach, chair alarm on and NURSE notified.    Assessment:  Veronica Sue is a 80 y.o. female with a medical diagnosis of Encephalopathy, metabolic and presents with IMPAIRED FUNCTIONAL MOBILITY. PT WILL BENEFIT FROM CONT. SKILLED P.T. TO ADDRESS IMPAIRMENTS    Rehab identified problem list/impairments: Rehab identified problem list/impairments: weakness, impaired endurance, impaired functional mobilty, gait instability, impaired balance, decreased coordination, decreased safety awareness    Rehab potential is good.    Activity tolerance: Fair    Discharge recommendations: Discharge Facility/Level of Care Needs: nursing facility, skilled     Barriers to discharge:      Equipment recommendations: Equipment Needed After Discharge: bath bench     GOALS:   Multidisciplinary Problems     Physical Therapy Goals        Problem: Physical Therapy Goal    Goal Priority Disciplines Outcome Goal Variances Interventions   Physical Therapy Goal     PT, PT/OT Ongoing, Progressing     Description:  PT WILL BE SEEN FOR P.T. FOR A MIN OF 5 OUT OF 7 DAYS A WEEK  LT20  1. PT WILL COMPLETE BED MOBILITY WITH MOD A  2. PT WILL T/F TO CHAIR WITH RW AND MOD A  3. PT WILL COMPLETE GT TRAINING X 20' WITH RW AND MOD A  4. PT WILL COMPLETE B LE TE X 20 REPS                    PLAN:    Patient to be seen 5 x/week  to address the above listed problems via therapeutic activities, therapeutic exercises, gait training  Plan of Care expires: 20  Plan of Care reviewed with: patient    Melany Servin, PT  2020

## 2020-04-14 NOTE — PLAN OF CARE
Swallowing re-assessed with pt seated upright in a chair at bedside.  She is more awake and participating more than yesterday.  Pt tolerated thin liquids, puree, and solids with a cough and throat clear times 1 only.  This indicates possible supraglottic penetration.  No overt signs of aspiration evident.  She required moderate cueing to attention to task and for self-feeding.  ST recommends a po diet of soft mechanical with chopped meats, no rice, no bread, and thin liquids with no straws.

## 2020-04-14 NOTE — SUBJECTIVE & OBJECTIVE
Interval History:     4/13/20: patient is resting comfortably, no issues at present. Hypercalcemia has improved with calcium declining to 10.9.     4/14/20: Calcium has started to rise again to 11.3 (albumin 2.8). Creatinine has increased to 1.7 today.         Review of patient's allergies indicates:   Allergen Reactions    Iodinated contrast media Swelling     Taken amiodorone    Levofloxacin     Minoxidil     Rosiglitazone      Current Facility-Administered Medications   Medication Frequency    0.9%  NaCl infusion Continuous    ALPRAZolam tablet 0.5 mg BID    amiodarone tablet 200 mg Daily    bisacodyL suppository 10 mg Daily PRN    cefTRIAXone (ROCEPHIN) 1 g in dextrose 5 % 50 mL IVPB Q24H    dextrose 10 % infusion PRN    dextrose 10 % infusion PRN    doxazosin tablet 4 mg Daily    glucagon (human recombinant) injection 1 mg PRN    glucose chewable tablet 16 g PRN    glucose chewable tablet 24 g PRN    hydrALAZINE injection 10 mg Q8H PRN    hydrALAZINE tablet 50 mg BID    insulin aspart U-100 pen 0-5 Units QID (AC + HS) PRN    levothyroxine tablet 88 mcg Before breakfast    metoprolol succinate (TOPROL-XL) 24 hr tablet 25 mg Daily    NIFEdipine 24 hr tablet 90 mg Daily    ondansetron disintegrating tablet 4 mg Q6H PRN    ondansetron injection 4 mg Q8H PRN    pantoprazole EC tablet 40 mg Daily    polyethylene glycol packet 17 g Daily    simvastatin tablet 40 mg QHS    sodium chloride 0.9% flush 10 mL PRN    vancomycin - pharmacy to dose pharmacy to manage frequency    zoledronic 4 mg/100 mL infusion 4 mg 1 time in Clinic/HOD       Objective:     Vital Signs (Most Recent):  Temp: 98.8 °F (37.1 °C) (04/14/20 0701)  Pulse: 64 (04/14/20 0759)  Resp: 19 (04/14/20 0701)  BP: (!) 148/63 (04/14/20 0701)  SpO2: 96 % (04/14/20 0759)  O2 Device (Oxygen Therapy): room air (04/14/20 0759) Vital Signs (24h Range):  Temp:  [97.2 °F (36.2 °C)-99 °F (37.2 °C)] 98.8 °F (37.1 °C)  Pulse:  [63-85]  64  Resp:  [16-19] 19  SpO2:  [94 %-98 %] 96 %  BP: (126-178)/() 148/63     Weight: 85.5 kg (188 lb 7.9 oz) (04/11/20 2308)  Body mass index is 31.37 kg/m².  Body surface area is 1.98 meters squared.    I/O last 3 completed shifts:  In: 1207.5 [P.O.:120; I.V.:487.5; IV Piggyback:600]  Out: 1001 [Urine:1000; Stool:1]    Physical Exam   Constitutional: She appears well-developed and well-nourished.   HENT:   Head: Normocephalic.   Eyes: Pupils are equal, round, and reactive to light.   Neck: No thyromegaly present.   Cardiovascular: Normal rate and regular rhythm. Exam reveals no friction rub.   Pulmonary/Chest: Effort normal and breath sounds normal. She has no wheezes. She exhibits no tenderness.   Abdominal: Soft. Bowel sounds are normal. She exhibits no distension. There is no tenderness.   Musculoskeletal: She exhibits no edema.   Lymphadenopathy:     She has no cervical adenopathy.   Neurological: She is alert.   Skin: Skin is warm and dry. No rash noted.       Significant Labs:  Lab Results   Component Value Date    CREATININE 1.7 (H) 04/14/2020    BUN 23 04/14/2020     04/14/2020    K 3.4 (L) 04/14/2020     04/14/2020    CO2 24 04/14/2020     Lab Results   Component Value Date    PTH 5.9 (L) 04/12/2020    CALCIUM 11.3 (H) 04/14/2020    PHOS 2.7 04/14/2020     Lab Results   Component Value Date    ALBUMIN 2.8 (L) 04/14/2020     Lab Results   Component Value Date    WBC 6.58 04/13/2020    HGB 10.5 (L) 04/13/2020    HCT 33.5 (L) 04/13/2020    MCV 90 04/13/2020    PLT 87 (L) 04/13/2020       Recent Labs   Lab 04/14/20  0533   MG 1.8     PTHrp: pending    SPEP: negative.     Significant Imaging:  Imaging Results          US Abdomen Limited (Final result)  Result time 04/12/20 15:44:10    Final result by Gerry Mckenzie MD (04/12/20 15:44:10)                 Impression:      1.  Abnormal study.  There are too numerous to count hepatic masses measuring up to 3.1 cm, concerning for metastasis and  less likely multiple focal primary neoplasm.    2.  Borderline splenomegaly.  The splenic calcifications, possibly related to old granulomatous disease.    3.  Small left kidney with cortical thinning.  Medical renal disease could have this appearance.  Clinical correlation is advised.    4.  Incidental findings as noted above.  Negative for acute process otherwise.      Electronically signed by: Gerry Mckenzie MD  Date:    04/12/2020  Time:    15:44             Narrative:    EXAMINATION:  US ABDOMEN LIMITED, color flow and spectral Doppler interrogation    CLINICAL HISTORY:  Hypercalcemia of unknown origin; elevated GGT; mildly elevated LFTs, suspicious for malignancy.;    COMPARISON:  No comparison studies are available.    FINDINGS:  The liver is heterogeneous in material with multiple hypodense lesions measuring up to 3.1 cm.  There is fatty infiltration of the liver as well.  The liver measures 17 cm. The gallbladder is normal. Negative for a sonographic Moreira's sign.  The common duct measures 3 mm. The right kidney measures 9 cm.  There is cortical thinning of the right kidney.  The right kidney is without focal solid or cystic masses, hydronephrosis, perinephric fluid collections or shadowing stones. The spleen measures 11.0 cm and is normal in appearance.  The visualized portions of the pancreas are normal.  There are calcifications in the spleen, likely related to calcified granulomas.  The aorta and IVC are normal in caliber.  Hepatopedal flow is documented in the portal vein.  The flow velocity in the hepatic vein is 17 centimeters/second.                               CT Head Without Contrast (Final result)  Result time 04/11/20 20:49:18    Final result by Calvin Seth MD (04/11/20 20:49:18)                 Impression:      No CT evidence of an acute intracranial process.  Age-appropriate cerebral atrophy with prominent symmetric bifrontal extra-axial fluid.    All CT scans at this facility are  performed  using dose modulation techniques as appropriate to performed exam including the following:  automated exposure control; adjustment of mA and/or kV according to the patients size (this includes techniques or standardized protocols for targeted exams where dose is matched to indication/reason for exam: i.e. extremities or head);  iterative reconstruction technique.      Electronically signed by: Calvin Seth MD  Date:    04/11/2020  Time:    20:49             Narrative:    EXAMINATION:  CT HEAD WITHOUT CONTRAST    CLINICAL HISTORY:  Confusion/delirium, altered LOC, unexplained;    TECHNIQUE:  Routine noncontrast head CT.    COMPARISON:  None    FINDINGS:  There is no acute intracranial hemorrhage or abnormal extra-axial fluid collection.  There is advanced cerebral atrophy with bilateral symmetric bifrontal extra-axial fluid.  Ventricular-sulcal congruence.  There is no abnormal increased or decreased density within the brain parenchyma.  Gray-white differentiation preserved.  There is no intracranial mass or mass effect.  The calvarium is intact.  Visualized paranasal sinuses and mastoids are well aerated.                               X-Ray Chest AP Portable (Final result)  Result time 04/11/20 20:24:18    Final result by Calvin Seth MD (04/11/20 20:24:18)                 Impression:      No acute process.      Electronically signed by: Calvin Seth MD  Date:    04/11/2020  Time:    20:24             Narrative:    EXAMINATION:  XR CHEST AP PORTABLE    CLINICAL HISTORY:  Transient alteration of awareness, unspecified    FINDINGS:  No prior study.  Normal size heart.  Aortic arch calcification.  No congestion.  Lungs are clear.  Multilevel anterior cervical fusion.

## 2020-04-14 NOTE — ASSESSMENT & PLAN NOTE
- Related to severe hypercalcemia   -Treated with volume expansion with NS and Lasix added to enhance renal excretion of calcium   -Added Zoledronic acid 4 mg x 1

## 2020-04-14 NOTE — PROGRESS NOTES
Palliative Care   Advance Care Planning     Received a consult for goals of care and Hospice from Hematology/Oncology today.  I attempted to visit with the patient however she was sleeping so I didn't wake her.  I was able to speak to the patient's grand-daughter, Amisha, who is also a nurse here in our ER.  She has requested to be the point of contact for the patient.  We discussed goals of care and their wish for hospice services. Amisha states that her grandmother has 4 total children, 2 sons, Rajiv and Kwan and 2 daughters, Ngozi and Zulay.  She stays in contact with them all and they are in agreement on her care.  Amisha states that given the patients current condition and co-morbidities, the family wishes to bring her home with hospice following the liver biopsy.  She states that her uncle is hopeful that the biopsy will provide some additional information that may be important for their family to know and may also give them a better time-line of her life expectancy.      The patient's home sits between two of her children's homes in [place]. They plan to take turns caring for her along side hospice once she is discharged from the hospital. They would like to get a hospital bed and bedside commode for the patient.  We discussed getting in touch with a hospice agency sooner than later to address any home equipment/supply needs so as not to hold up discharge.  She wasn't sure which hospice agency they would like to select and plans to call back with this information once she has spoke with the family.    Thank you for the consult.    Deidre Jones RN  Palliative Care  [phone]

## 2020-04-14 NOTE — ASSESSMENT & PLAN NOTE
Patient presented with constipation, abdominal pain, MS change (as per family) which are typical hypercalcemia symptoms.  No clear etiology. PTH appropriately suppressed. PTHrp are pending. Vitamin D level not elevated. SPEP did not show any paraproteins. S/p IV fluids and lasix. Calcium has increased to 11.3 today (albumin 2.8). Work-up revealed liver masses suggestive of liver cancer. Patient may suffer from hypercalcemia due to cancer-producing PTHrp. Agree with Zometa administration.

## 2020-04-14 NOTE — PT/OT/SLP PROGRESS
Speech Language Pathology Treatment    Patient Name:  Veronica Sue   MRN:  [id number]  Admitting Diagnosis: Encephalopathy, metabolic    Recommendations:                 General Recommendations:  Dysphagia therapy  Diet recommendations:  Mechanical soft, No Rice, No Bread, Chopped meat, Liquid Diet Level: Thin   Aspiration Precautions: 1 bite/sip at a time, Assistance with meals, Eliminate distractions, Feed only when awake/alert, HOB to 90 degrees, Remain upright 30 minutes post meal and Small bites/sips   General Precautions: Standard, fall    Subjective   Pt seen sitting in a chair at bedside.    Patient goals: none reported     Pain/Comfort:  · Pain Rating 1: 0/10    Objective:     Has the patient been evaluated by SLP for swallowing?   Yes  Keep patient NPO? No   Current Respiratory Status: room air      Swallowing re-assessed with pt seated upright in a chair at bedside.  She is more awake and participating more than yesterday.  Pt tolerated thin liquids, puree, and solids with a cough and throat clear times 1 only.  This indicates possible supraglottic penetration.  No overt signs of aspiration evident.  She required moderate cueing to attention to task and for self-feeding.  ST recommends a po diet of soft mechanical with chopped meats, no rice, no bread, and thin liquids with no straws.       Assessment:     Veronica Sue is a 80 y.o. female with an SLP diagnosis of Dysphagia.  She presents with overall weakness and decreased swallow response time increasing pt's aspiration risk.  Pt will continue to benefit from ST per POC    Goals:   Multidisciplinary Problems     SLP Goals        Problem: SLP Goal    Goal Priority Disciplines Outcome   SLP Goal     SLP Ongoing, Progressing   Description:  1. Pt will tolerate least restrictive diet without incidence while utilizing safe swallow strategies with mod A  2. Pt will complete oral and pharyngeal ex's with min A for increased strength and ROM                       Plan:     · Patient to be seen:  3 x/week   · Plan of Care expires:  04/20/20  · Plan of Care reviewed with:  patient   · SLP Follow-Up:  Yes       Discharge recommendations:  nursing facility, skilled   Barriers to Discharge:  None    Time Tracking:     SLP Treatment Date:   04/14/20  Speech Start Time:  1000  Speech Stop Time:  1030     Speech Total Time (min):  30 min    Billable Minutes: Speech Therapy Individual 15 and Treatment Swallowing Dysfunction 15    Shraddha Cabrera CCC-SLP  04/14/2020

## 2020-04-14 NOTE — PROGRESS NOTES
[facility] -   Hematology/Oncology  Progress Note    Patient Name: Veronica Sue  Admission Date: 4/11/2020  Hospital Length of Stay: 3 days  Code Status: DNR     Subjective:     HPI:  80 year old female with arthritis, atrial fibrillation, CHF, CKD 4, CAD, DM2, LE DVT, HTN presents to [facility] with weakness, nausea and vomiting, constipation.   Work-up revealed severe hypercalcemia and numerous hypodense liver lesions, largest measures 3.1 cm, CT not completed due to impaired renal function. Hem/Onc consulted due to lever lesions and hypercalcemia. Patient is scheduled for liver biopsy with IR for Wednesday.     Interval History: Patient is more confused today and is drowsy. Will evaluate an ammonia level. Plan is for a liver biopsy per IR, due to patient comorbid conditions and current status, treatment would not be possible. Patient is a candidate for hospice and avoiding invasive procedures may be in patient best interest. Consulted Palliative Medicine team to discuss goals of care and offer hospice.     Oncology Treatment Plan:   [No treatment plan]    Medications:  Continuous Infusions:   sodium chloride 0.9% 100 mL/hr at 04/14/20 0913     Scheduled Meds:   ALPRAZolam  0.5 mg Oral BID    amiodarone  200 mg Oral Daily    cefTRIAXone (ROCEPHIN) IVPB  1 g Intravenous Q24H    doxazosin  4 mg Oral Daily    hydrALAZINE  50 mg Oral BID    levothyroxine  88 mcg Oral Before breakfast    metoprolol succinate  25 mg Oral Daily    NIFEdipine  90 mg Oral Daily    pantoprazole  40 mg Oral Daily    polyethylene glycol  17 g Oral Daily    simvastatin  40 mg Oral QHS     PRN Meds:bisacodyL, dextrose 10 % in water (D10W), dextrose 10 % in water (D10W), glucagon (human recombinant), glucose, glucose, hydrALAZINE, insulin aspart U-100, ondansetron, ondansetron, sodium chloride 0.9%, Pharmacy to dose Vancomycin consult **AND** vancomycin - pharmacy to dose     Review of Systems   Unable to perform ROS: Mental  status change     Objective:     Vital Signs (Most Recent):  Temp: 98.8 °F (37.1 °C) (04/14/20 0701)  Pulse: 64 (04/14/20 0759)  Resp: 19 (04/14/20 0701)  BP: (!) 148/63 (04/14/20 0701)  SpO2: 96 % (04/14/20 0759) Vital Signs (24h Range):  Temp:  [97.2 °F (36.2 °C)-99 °F (37.2 °C)] 98.8 °F (37.1 °C)  Pulse:  [63-85] 64  Resp:  [16-19] 19  SpO2:  [94 %-98 %] 96 %  BP: (126-178)/() 148/63     Weight: 85.5 kg (188 lb 7.9 oz)  Body mass index is 31.37 kg/m².  Body surface area is 1.98 meters squared.      Intake/Output Summary (Last 24 hours) at 4/14/2020 1142  Last data filed at 4/14/2020 0402  Gross per 24 hour   Intake 537.5 ml   Output 701 ml   Net -163.5 ml       Physical Exam   Constitutional: She appears lethargic. She appears cachectic. She has a sickly appearance. She appears ill. No distress.   HENT:   Head: Normocephalic and atraumatic.   Right Ear: External ear normal.   Left Ear: External ear normal.   Nose: No rhinorrhea or sinus tenderness. Right sinus exhibits no maxillary sinus tenderness and no frontal sinus tenderness. Left sinus exhibits no maxillary sinus tenderness and no frontal sinus tenderness.   Mouth/Throat: Uvula is midline, oropharynx is clear and moist and mucous membranes are normal. No oral lesions.   Eyes: Pupils are equal, round, and reactive to light. Conjunctivae are normal. Right eye exhibits no discharge. Left eye exhibits no discharge.   Cardiovascular: Normal rate, regular rhythm, S1 normal, S2 normal, normal heart sounds and intact distal pulses.   No murmur heard.  Pulses:       Dorsalis pedis pulses are 2+ on the right side, and 2+ on the left side.   Pulmonary/Chest: Effort normal. No respiratory distress. She has decreased breath sounds in the right lower field and the left lower field.   Abdominal: Soft. Bowel sounds are normal. She exhibits no distension and no mass. There is no tenderness.   Musculoskeletal: Normal range of motion.   Lymphadenopathy:     She has no  cervical adenopathy.        Right: No supraclavicular adenopathy present.        Left: No supraclavicular adenopathy present.   Neurological: She appears lethargic.   Skin: Skin is warm and dry. Capillary refill takes less than 2 seconds. No rash noted.   Psychiatric: Cognition and memory are impaired.   Vitals reviewed.      Significant Labs:   CBC:   Recent Labs   Lab 04/13/20  0515 04/14/20  0533   WBC 6.58 7.59   HGB 10.5* 10.0*   HCT 33.5* 31.0*   PLT 87* 208    and CMP:   Recent Labs   Lab 04/13/20  0515 04/14/20  0533    141   K 3.8 3.4*    108   CO2 20* 24   * 218*   BUN 18 23   CREATININE 1.4 1.7*   CALCIUM 10.9* 11.3*   PROT 6.4 6.0   ALBUMIN 3.1* 2.8*   BILITOT 0.5 0.5   ALKPHOS 184* 160*   AST 68* 58*   ALT 50* 39   ANIONGAP 12 9   EGFRNONAA 36* 28*       Diagnostic Results:  I have reviewed all pertinent imaging results/findings within the past 24 hours.    Assessment/Plan:     Hepatic lesions / masses   TMTC hypodense liver lesions on US, unable to complete CT due to poor kidney function. Plan is for biopsy per IR on Wednesday, Eliquis has been held to prepare for procedure.   --Due to comorbid conditions and overall status, patient would likely not be a candidate for treatment. In consideration of this, patient would be a good candidate for hospice. The liver biopsy may cause more pain and suffering for the patient. Consulted palliative medicine to discuss goals of care and hospice, if the family would agree to admit to hospice the oncology team would support that decision.    --Continue supportive care  --Will review results when available    CKD (chronic kidney disease) stage 4, GFR 15-29 ml/min  Management per Nephrology    Hypercalcemia of malignancy  Hypercalcemia likely related to malignancy, PTHrp pending, PTH low. TMTC hypodense liver lesions seen on US, unable to complete CT scan due to poor kidney function. Plan is for liver biopsy per IR on Wednesday, Eliquis has been  held to prepare for procedure. Depending on biopsy results will determine prognosis and treatment options. Treatment options will likely be limited due to patient overall condition or comorbid diagnoses. Calcitonin given on 4/11/2020, patient can discharge on intranasal calcitonin if needed.     --Will review pathology results when available and discuss results and prognosis with family at that time  --Continue supportive care  --Daily CBC, CMP  --Zoledronic Acid ordered per Nephrology        Thank you for your consult. I will follow-up with patient. Please contact us if you have any additional questions.     Winnie Huitron NP  Hematology/Oncology  [facility]

## 2020-04-14 NOTE — ASSESSMENT & PLAN NOTE
- H/O A.fib   -EKG resulted SR  -On eliquis for primary stroke prevention   -Eliquis is currently on hold for planned Liver biopsy on 4/15/20

## 2020-04-14 NOTE — ASSESSMENT & PLAN NOTE
- Intact PTH is suppressed   -PTHrP is pending   -U/S abd resulted numerous hepatic masses and elevated AFP   -Treated with volume expansion with NS and Lasix to enhance renal excretion of calcium   -May need Bisphosphonate therapy   -Consult Hem/Oncology   -Zoledronic acid 4 mg IV x 1

## 2020-04-14 NOTE — PROGRESS NOTES
[facility] -   Nephrology  Progress Note    Patient Name: Veronica Sue  MRN: [id number]  Admission Date: 4/11/2020  Hospital Length of Stay: 3 days  Attending Provider: Nina Pink MD   Primary Care Physician: Primary Doctor No  Principal Problem:Encephalopathy, metabolic    Subjective:     HPI: 80 year old female with arthritis, atrial fibrillation, CHF, CKD 4, CAD, DM2, LE DVT, HTN presents to [facility] with weakness, nausea and vomiting, constipation.   Work-up revealed severe hypercalcemia.    Nephrology was consulted to help with patient's renal care while she is admitted at [facility]. I saw and examined patient in her hospital room. Patient is complaining of mild abdominal pain, nausea/vomiting, constipation, dysuria. No chest pain, SOB, LE edema, fever.     Chart review revealed that patient suffers drom CKD 4 with baseline creatinine of about 2. Last Ca from 11/23/19 was 9.4.     Interval History:     4/13/20: patient is resting comfortably, no issues at present. Hypercalcemia has improved with calcium declining to 10.9.     4/14/20: Calcium has started to rise again to 11.3 (albumin 2.8). Creatinine has increased to 1.7 today.         Review of patient's allergies indicates:   Allergen Reactions    Iodinated contrast media Swelling     Taken amiodorone    Levofloxacin     Minoxidil     Rosiglitazone      Current Facility-Administered Medications   Medication Frequency    0.9%  NaCl infusion Continuous    ALPRAZolam tablet 0.5 mg BID    amiodarone tablet 200 mg Daily    bisacodyL suppository 10 mg Daily PRN    cefTRIAXone (ROCEPHIN) 1 g in dextrose 5 % 50 mL IVPB Q24H    dextrose 10 % infusion PRN    dextrose 10 % infusion PRN    doxazosin tablet 4 mg Daily    glucagon (human recombinant) injection 1 mg PRN    glucose chewable tablet 16 g PRN    glucose chewable tablet 24 g PRN    hydrALAZINE injection 10 mg Q8H PRN    hydrALAZINE tablet 50 mg BID    insulin aspart U-100 pen 0-5 Units  QID (AC + HS) PRN    levothyroxine tablet 88 mcg Before breakfast    metoprolol succinate (TOPROL-XL) 24 hr tablet 25 mg Daily    NIFEdipine 24 hr tablet 90 mg Daily    ondansetron disintegrating tablet 4 mg Q6H PRN    ondansetron injection 4 mg Q8H PRN    pantoprazole EC tablet 40 mg Daily    polyethylene glycol packet 17 g Daily    simvastatin tablet 40 mg QHS    sodium chloride 0.9% flush 10 mL PRN    vancomycin - pharmacy to dose pharmacy to manage frequency    zoledronic 4 mg/100 mL infusion 4 mg 1 time in Clinic/HOD       Objective:     Vital Signs (Most Recent):  Temp: 98.8 °F (37.1 °C) (04/14/20 0701)  Pulse: 64 (04/14/20 0759)  Resp: 19 (04/14/20 0701)  BP: (!) 148/63 (04/14/20 0701)  SpO2: 96 % (04/14/20 0759)  O2 Device (Oxygen Therapy): room air (04/14/20 0759) Vital Signs (24h Range):  Temp:  [97.2 °F (36.2 °C)-99 °F (37.2 °C)] 98.8 °F (37.1 °C)  Pulse:  [63-85] 64  Resp:  [16-19] 19  SpO2:  [94 %-98 %] 96 %  BP: (126-178)/() 148/63     Weight: 85.5 kg (188 lb 7.9 oz) (04/11/20 2308)  Body mass index is 31.37 kg/m².  Body surface area is 1.98 meters squared.    I/O last 3 completed shifts:  In: 1207.5 [P.O.:120; I.V.:487.5; IV Piggyback:600]  Out: 1001 [Urine:1000; Stool:1]    Physical Exam   Constitutional: She appears well-developed and well-nourished.   HENT:   Head: Normocephalic.   Eyes: Pupils are equal, round, and reactive to light.   Neck: No thyromegaly present.   Cardiovascular: Normal rate and regular rhythm. Exam reveals no friction rub.   Pulmonary/Chest: Effort normal and breath sounds normal. She has no wheezes. She exhibits no tenderness.   Abdominal: Soft. Bowel sounds are normal. She exhibits no distension. There is no tenderness.   Musculoskeletal: She exhibits no edema.   Lymphadenopathy:     She has no cervical adenopathy.   Neurological: She is alert.   Skin: Skin is warm and dry. No rash noted.       Significant Labs:  Lab Results   Component Value Date     CREATININE 1.7 (H) 04/14/2020    BUN 23 04/14/2020     04/14/2020    K 3.4 (L) 04/14/2020     04/14/2020    CO2 24 04/14/2020     Lab Results   Component Value Date    PTH 5.9 (L) 04/12/2020    CALCIUM 11.3 (H) 04/14/2020    PHOS 2.7 04/14/2020     Lab Results   Component Value Date    ALBUMIN 2.8 (L) 04/14/2020     Lab Results   Component Value Date    WBC 6.58 04/13/2020    HGB 10.5 (L) 04/13/2020    HCT 33.5 (L) 04/13/2020    MCV 90 04/13/2020    PLT 87 (L) 04/13/2020       Recent Labs   Lab 04/14/20  0533   MG 1.8     PTHrp: pending    SPEP: negative.     Significant Imaging:  Imaging Results          US Abdomen Limited (Final result)  Result time 04/12/20 15:44:10    Final result by Gerry Mckenzie MD (04/12/20 15:44:10)                 Impression:      1.  Abnormal study.  There are too numerous to count hepatic masses measuring up to 3.1 cm, concerning for metastasis and less likely multiple focal primary neoplasm.    2.  Borderline splenomegaly.  The splenic calcifications, possibly related to old granulomatous disease.    3.  Small left kidney with cortical thinning.  Medical renal disease could have this appearance.  Clinical correlation is advised.    4.  Incidental findings as noted above.  Negative for acute process otherwise.      Electronically signed by: Gerry Mckenzie MD  Date:    04/12/2020  Time:    15:44             Narrative:    EXAMINATION:  US ABDOMEN LIMITED, color flow and spectral Doppler interrogation    CLINICAL HISTORY:  Hypercalcemia of unknown origin; elevated GGT; mildly elevated LFTs, suspicious for malignancy.;    COMPARISON:  No comparison studies are available.    FINDINGS:  The liver is heterogeneous in material with multiple hypodense lesions measuring up to 3.1 cm.  There is fatty infiltration of the liver as well.  The liver measures 17 cm. The gallbladder is normal. Negative for a sonographic Moreira's sign.  The common duct measures 3 mm. The right kidney measures  9 cm.  There is cortical thinning of the right kidney.  The right kidney is without focal solid or cystic masses, hydronephrosis, perinephric fluid collections or shadowing stones. The spleen measures 11.0 cm and is normal in appearance.  The visualized portions of the pancreas are normal.  There are calcifications in the spleen, likely related to calcified granulomas.  The aorta and IVC are normal in caliber.  Hepatopedal flow is documented in the portal vein.  The flow velocity in the hepatic vein is 17 centimeters/second.                               CT Head Without Contrast (Final result)  Result time 04/11/20 20:49:18    Final result by Calvin Seth MD (04/11/20 20:49:18)                 Impression:      No CT evidence of an acute intracranial process.  Age-appropriate cerebral atrophy with prominent symmetric bifrontal extra-axial fluid.    All CT scans at this facility are performed  using dose modulation techniques as appropriate to performed exam including the following:  automated exposure control; adjustment of mA and/or kV according to the patients size (this includes techniques or standardized protocols for targeted exams where dose is matched to indication/reason for exam: i.e. extremities or head);  iterative reconstruction technique.      Electronically signed by: Calvin Seth MD  Date:    04/11/2020  Time:    20:49             Narrative:    EXAMINATION:  CT HEAD WITHOUT CONTRAST    CLINICAL HISTORY:  Confusion/delirium, altered LOC, unexplained;    TECHNIQUE:  Routine noncontrast head CT.    COMPARISON:  None    FINDINGS:  There is no acute intracranial hemorrhage or abnormal extra-axial fluid collection.  There is advanced cerebral atrophy with bilateral symmetric bifrontal extra-axial fluid.  Ventricular-sulcal congruence.  There is no abnormal increased or decreased density within the brain parenchyma.  Gray-white differentiation preserved.  There is no intracranial mass or mass effect.  The  calvarium is intact.  Visualized paranasal sinuses and mastoids are well aerated.                               X-Ray Chest AP Portable (Final result)  Result time 04/11/20 20:24:18    Final result by Calvin Seth MD (04/11/20 20:24:18)                 Impression:      No acute process.      Electronically signed by: Calvin Seth MD  Date:    04/11/2020  Time:    20:24             Narrative:    EXAMINATION:  XR CHEST AP PORTABLE    CLINICAL HISTORY:  Transient alteration of awareness, unspecified    FINDINGS:  No prior study.  Normal size heart.  Aortic arch calcification.  No congestion.  Lungs are clear.  Multilevel anterior cervical fusion.                                  Assessment/Plan:     Disorder of electrolytes  Hypomagnesemia: has resolved.     Hypophosphatemia: has resolved.     Hypokalemia: replete K.     CKD (chronic kidney disease) stage 4, GFR 15-29 ml/min  Renal function has slightly worsened with creatinine increasing to 1.7. Continue mild hydration.     Diastolic heart failure  Patient on IV fluids, monitor carefully for volume overload.     Accelerated hypertension  Adjust meds as indicated to obtain better BP control.     Hypercalcemia of malignancy  Patient presented with constipation, abdominal pain, MS change (as per family) which are typical hypercalcemia symptoms.  No clear etiology. PTH appropriately suppressed. PTHrp are pending. Vitamin D level not elevated. SPEP did not show any paraproteins. S/p IV fluids and lasix. Calcium has increased to 11.3 today (albumin 2.8). Work-up revealed liver masses suggestive of liver cancer. Patient may suffer from hypercalcemia due to cancer-producing PTHrp. Agree with Zometa administration.       Acute cystitis without hematuria  Continue antibiotics.         Thank you for your consult. I will follow-up with patient. Please contact us if you have any additional questions.    Reed Bledsoe MD  Nephrology  [facility] -

## 2020-04-14 NOTE — ASSESSMENT & PLAN NOTE
[facility] hypodense liver lesions on US, unable to complete CT due to poor kidney function. Plan is for biopsy per IR on Wednesday, Eliquis has been held to prepare for procedure.   --Due to comorbid conditions and overall status, patient would likely not be a candidate for treatment. In consideration of this, patient would be a good candidate for hospice. The liver biopsy may cause more pain and suffering for the patient. Consulted palliative medicine to discuss goals of care and hospice, if the family would agree to admit to hospice the oncology team would support that decision.    --Continue supportive care  --Will review results when available

## 2020-04-14 NOTE — PLAN OF CARE
PATIENT WOULD BENEFIT FROM CONT SKILLED OT INTERVENTION TO INCREASE SAFETY AND (I) WITH ALL AREAS OF SELF CARE.

## 2020-04-14 NOTE — PLAN OF CARE
Fall precautions maintained, pt free from injuries/fall.  Repositions wstaff x 2  Ambulates w staff x 2  C/o generlaized weakness. NPO after midnight for Biopsy candy  POC and meds discussed, both verbalized understanding.  Side rails x2, bed locked and low, phone and call light w/in reach.  Chart check done. Will cont to monitor.

## 2020-04-14 NOTE — NURSING
Patient lying supine in bed resting with eyes closed. No signs of distress noted. Bed alarm in use. Call light in reach. SCD's intact

## 2020-04-14 NOTE — ASSESSMENT & PLAN NOTE
- IR consult for CT guided liver biopsy . Will be performed on 4/15/20   -Consult Hem/Oncology

## 2020-04-14 NOTE — SUBJECTIVE & OBJECTIVE
Interval History:   Pt is seen at bedside , resting comfortable . She is awake , oriented to self , person and place today . States she feels little bit better. Serum calcium is rising . Discussed case with Nephro and approved Zoledronic acid 4 mg IV x 1 dose given serum creatinine 1.7 today . Plan for liver biopsy tomorrow still in place . Off of eliquis         Review of Systems   Constitutional: Positive for activity change, appetite change, fatigue and unexpected weight change. Negative for fever.   HENT: Negative for sore throat.    Eyes: Negative for visual disturbance.   Respiratory: Negative for cough, chest tightness and shortness of breath.    Cardiovascular: Negative for chest pain, palpitations and leg swelling.   Gastrointestinal: Positive for constipation and nausea. Negative for abdominal distention, abdominal pain, diarrhea and vomiting.   Endocrine: Negative for polyuria.   Genitourinary: Negative for decreased urine volume, dysuria, flank pain, frequency and hematuria.   Musculoskeletal: Negative for back pain and gait problem.   Skin: Negative for rash.   Neurological: Positive for tremors and weakness. Negative for syncope, speech difficulty, light-headedness and headaches.   Psychiatric/Behavioral: Positive for confusion. Negative for hallucinations and sleep disturbance.     Objective:     Vital Signs (Most Recent):  Temp: 98.8 °F (37.1 °C) (04/14/20 0701)  Pulse: 64 (04/14/20 1331)  Resp: 19 (04/14/20 1331)  BP: 138/64 (04/14/20 1331)  SpO2: 96 % (04/14/20 1331) Vital Signs (24h Range):  Temp:  [97.7 °F (36.5 °C)-99 °F (37.2 °C)] 98.8 °F (37.1 °C)  Pulse:  [61-85] 64  Resp:  [16-19] 19  SpO2:  [94 %-97 %] 96 %  BP: (126-178)/() 138/64     Weight: 85.5 kg (188 lb 7.9 oz)  Body mass index is 31.37 kg/m².    Intake/Output Summary (Last 24 hours) at 4/14/2020 1547  Last data filed at 4/14/2020 0402  Gross per 24 hour   Intake 537.5 ml   Output 101 ml   Net 436.5 ml      Physical Exam    Constitutional: She appears well-developed. No distress.   HENT:   Head: Normocephalic and atraumatic.   Mouth/Throat: No oropharyngeal exudate.   Eyes: Pupils are equal, round, and reactive to light. Conjunctivae and EOM are normal.   Neck: Normal range of motion. Neck supple. No JVD present. No thyromegaly present.   Cardiovascular: Normal rate, regular rhythm and normal heart sounds.   No murmur heard.  Pulmonary/Chest: Effort normal and breath sounds normal. No respiratory distress. She has no wheezes. She has no rales. She exhibits no tenderness.   Abdominal: Soft. Bowel sounds are normal. She exhibits no distension. There is no tenderness. There is no rebound and no guarding.   Musculoskeletal: Normal range of motion. She exhibits no edema.   Lymphadenopathy:     She has no cervical adenopathy.   Neurological: She displays normal reflexes. No cranial nerve deficit or sensory deficit.   Awake , oriented to self and person and place    Skin: Skin is warm and dry. No rash noted. She is not diaphoretic.   Psychiatric: She has a normal mood and affect.       Significant Labs:   CBC:   Recent Labs   Lab 04/13/20  0515 04/14/20  0533   WBC 6.58 7.59   HGB 10.5* 10.0*   HCT 33.5* 31.0*   PLT 87* 208     CMP:   Recent Labs   Lab 04/13/20  0515 04/14/20  0533    141   K 3.8 3.4*    108   CO2 20* 24   * 218*   BUN 18 23   CREATININE 1.4 1.7*   CALCIUM 10.9* 11.3*   PROT 6.4 6.0   ALBUMIN 3.1* 2.8*   BILITOT 0.5 0.5   ALKPHOS 184* 160*   AST 68* 58*   ALT 50* 39   ANIONGAP 12 9   EGFRNONAA 36* 28*       Significant Imaging:

## 2020-04-14 NOTE — PROGRESS NOTES
[facility] Hospital Medicine  Progress Note    Patient Name: Veronica Sue  MRN: [id number]  Patient Class: IP- Inpatient   Admission Date: 4/11/2020  Length of Stay: 3 days  Attending Physician: Nina Pink MD  Primary Care Provider: Primary Doctor No        Subjective:     Principal Problem:Encephalopathy, metabolic        HPI:  Brought in to the Emergency Department because of weakness. PMHx significant for A.fib, Cerebellar stroke, CAD, CKD III, DM 2, Diastolic HF/ HFpEF, DVT left peroneal vein.   Fell several times at home.  Weak and unable to keep balance.  Getting worse over the last week.  Also having general body aches and started nausea and vomiting tonight.  No problems urinating but is having bad constipation for some time.  No cough, shortness of breath, or chest pain.  Denies fevers or chills.  No known recent sick contacts.  No medicaiton changes. Family reports persistent nausea , decreased appetite and  significant weight loss nearly 15 to 20 pounds in the past month.      Overview/Hospital Course:  4/13- Pt remains very weak in general . She is awake , oriented to self. Speech is slow and does not make eye contact. Fine tremor is noted ria hands This morning per nursing staff , pt did not want to eat or drink. Speech therapy is consulted for swallow eval.  IR consult is placed for CT guided liver biopsy and will be performed on Wednesday . Eliquis is on hold in that regard . Hem/Onc also consulted . Labs resulted Hb 10.5, Pl clumped , creatinine down to 1.4, Calcium down to 10.9 , Intact PTH 5.9, AFP 45132, CA 19-9 67,   15.   4/14- Pt is seen at bedside , resting comfortable . She is awake , oriented to self , person and place today . States she feels little bit better. Serum calcium is rising . Discussed case with Nephro and approved Zoledronic acid 4 mg IV x 1 dose given serum creatinine 1.7 today . Plan for liver biopsy tomorrow still in place . Off of eliquis      Interval History:   Pt is seen at bedside , resting comfortable . She is awake , oriented to self , person and place today . States she feels little bit better. Serum calcium is rising . Discussed case with Nephro and approved Zoledronic acid 4 mg IV x 1 dose given serum creatinine 1.7 today . Plan for liver biopsy tomorrow still in place . Off of eliquis         Review of Systems   Constitutional: Positive for activity change, appetite change, fatigue and unexpected weight change. Negative for fever.   HENT: Negative for sore throat.    Eyes: Negative for visual disturbance.   Respiratory: Negative for cough, chest tightness and shortness of breath.    Cardiovascular: Negative for chest pain, palpitations and leg swelling.   Gastrointestinal: Positive for constipation and nausea. Negative for abdominal distention, abdominal pain, diarrhea and vomiting.   Endocrine: Negative for polyuria.   Genitourinary: Negative for decreased urine volume, dysuria, flank pain, frequency and hematuria.   Musculoskeletal: Negative for back pain and gait problem.   Skin: Negative for rash.   Neurological: Positive for tremors and weakness. Negative for syncope, speech difficulty, light-headedness and headaches.   Psychiatric/Behavioral: Positive for confusion. Negative for hallucinations and sleep disturbance.     Objective:     Vital Signs (Most Recent):  Temp: 98.8 °F (37.1 °C) (04/14/20 0701)  Pulse: 64 (04/14/20 1331)  Resp: 19 (04/14/20 1331)  BP: 138/64 (04/14/20 1331)  SpO2: 96 % (04/14/20 1331) Vital Signs (24h Range):  Temp:  [97.7 °F (36.5 °C)-99 °F (37.2 °C)] 98.8 °F (37.1 °C)  Pulse:  [61-85] 64  Resp:  [16-19] 19  SpO2:  [94 %-97 %] 96 %  BP: (126-178)/() 138/64     Weight: 85.5 kg (188 lb 7.9 oz)  Body mass index is 31.37 kg/m².    Intake/Output Summary (Last 24 hours) at 4/14/2020 1547  Last data filed at 4/14/2020 0402  Gross per 24 hour   Intake 537.5 ml   Output 101 ml   Net 436.5 ml      Physical Exam    Constitutional: She appears well-developed. No distress.   HENT:   Head: Normocephalic and atraumatic.   Mouth/Throat: No oropharyngeal exudate.   Eyes: Pupils are equal, round, and reactive to light. Conjunctivae and EOM are normal.   Neck: Normal range of motion. Neck supple. No JVD present. No thyromegaly present.   Cardiovascular: Normal rate, regular rhythm and normal heart sounds.   No murmur heard.  Pulmonary/Chest: Effort normal and breath sounds normal. No respiratory distress. She has no wheezes. She has no rales. She exhibits no tenderness.   Abdominal: Soft. Bowel sounds are normal. She exhibits no distension. There is no tenderness. There is no rebound and no guarding.   Musculoskeletal: Normal range of motion. She exhibits no edema.   Lymphadenopathy:     She has no cervical adenopathy.   Neurological: She displays normal reflexes. No cranial nerve deficit or sensory deficit.   Awake , oriented to self and person and place    Skin: Skin is warm and dry. No rash noted. She is not diaphoretic.   Psychiatric: She has a normal mood and affect.       Significant Labs:   CBC:   Recent Labs   Lab 04/13/20  0515 04/14/20  0533   WBC 6.58 7.59   HGB 10.5* 10.0*   HCT 33.5* 31.0*   PLT 87* 208     CMP:   Recent Labs   Lab 04/13/20  0515 04/14/20  0533    141   K 3.8 3.4*    108   CO2 20* 24   * 218*   BUN 18 23   CREATININE 1.4 1.7*   CALCIUM 10.9* 11.3*   PROT 6.4 6.0   ALBUMIN 3.1* 2.8*   BILITOT 0.5 0.5   ALKPHOS 184* 160*   AST 68* 58*   ALT 50* 39   ANIONGAP 12 9   EGFRNONAA 36* 28*       Significant Imaging:       Assessment/Plan:      * Encephalopathy, metabolic  - Related to severe hypercalcemia   -Treated with volume expansion with NS and Lasix added to enhance renal excretion of calcium   -Added Zoledronic acid 4 mg x 1         Accelerated hypertension  - Initially required Cardene ggt and currently weaned off   -Resume home antihypertensives       Hypercalcemia of malignancy  -  Intact PTH is suppressed   -PTHrP is pending   -U/S abd resulted numerous hepatic masses and elevated AFP   -Treated with volume expansion with NS and Lasix to enhance renal excretion of calcium   -May need Bisphosphonate therapy   -Consult Hem/Oncology   -Zoledronic acid 4 mg IV x 1     Hepatic lesions / masses   - IR consult for CT guided liver biopsy . Will be performed on 4/15/20   -Consult Hem/Oncology         Disorder of electrolytes  - Hypercalcemia treated with volume expansion with NS and Lasix for renal excretion of calcium   -Hypomagnesemia and hypophosphatemia noted on admit and replete accordingly        Acute cystitis without hematuria  - Started on Rocephin   -Follow urine culture   -Urine culture resulted growth of E.coli sensitive to ceftriaxone       CKD (chronic kidney disease) stage 4, GFR 15-29 ml/min  - Monitor renal indices       DVT, lower extremity  - On chronic anticoagulation with Eliquis       Diabetes  - ISS       Atrial fibrillation  - H/O A.fib   -EKG resulted SR  -On eliquis for primary stroke prevention   -Eliquis is currently on hold for planned Liver biopsy on 4/15/20      Diastolic heart failure  - Currently compensated         VTE Risk Mitigation (From admission, onward)    None                Nina Pink MD  Department of Hospital Medicine   [facility]

## 2020-04-14 NOTE — PT/OT/SLP PROGRESS
"Occupational Therapy  Treatment    Veronica Sue   MRN: [id number]   Admitting Diagnosis: Encephalopathy, metabolic    OT Date of Treatment: 04/14/20   OT Start Time: 0948  OT Stop Time: 1003  OT Total Time (min): 15 min    Billable Minutes:  Self Care/Home Management 15    General Precautions: Standard, fall  Orthopedic Precautions: N/A  Braces: N/A         Subjective:  Communicated with NURSE prior to session.       Pain/Comfort  Pain Rating 1: 0/10    Objective:  Patient found with: telemetry, peripheral IV, PureWick     Functional Mobility:  Bed Mobility:       Transfers:        Functional Ambulation: NTT    Activities of Daily Living:     Feeding adaptive equipment: NT     UE adaptive equipment: NT     LE adaptive equipment: NT                    Bathing adaptive equipment: NT    Balance:   Static Sit: POOR: Needs MODERATE assist to maintain  Dynamic Sit: 0: N/A  Static Stand: MAX (A)  Dynamic stand: MAX (A) X2 PERSONS    Therapeutic Activities and Exercises:  PATIENT T/F'ED SUPINE > SIT > STAND WITH MAX (A) X2 AND STAND PIVOT TO B/S CHAIR WITH RW WITH MAX (A) X2.  PATIENT WASHED FACE AND PERFORMED ORAL HYGIENE MIN (A) AND COMBED HAIR WITH MAX (A).  PATIENT REQURED MAX VC TO OPEN EYES AND ATTEND TO TASKS.    AM-PAC 6 CLICK ADL   How much help from another person does this patient currently need?   1 = Unable, Total/Dependent Assistance  2 = A lot, Maximum/Moderate Assistance  3 = A little, Minimum/Contact Guard/Supervision  4 = None, Modified Elliott/Independent    Putting on and taking off regular lower body clothing? : 2  Bathing (including washing, rinsing, drying)?: 2  Toileting, which includes using toilet, bedpan, or urinal? : 2  Putting on and taking off regular upper body clothing?: 2  Taking care of personal grooming such as brushing teeth?: 2  Eating meals?: 2  Daily Activity Total Score: 12     AM-PAC Raw Score CMS "G-Code Modifier Level of Impairment Assistance   6 % Total / Unable   7 - 8 " CM 80 - 100% Maximal Assist   9-13 CL 60 - 80% Moderate Assist   14 - 19 CK 40 - 60% Moderate Assist   20 - 22 CJ 20 - 40% Minimal Assist   23 CI 1-20% SBA / CGA   24 CH 0% Independent/ Mod I       Patient left up in chair with all lines intact, call button in reach, chair alarm on and SPEECH THERAPIST present    ASSESSMENT:  Veronica Sue is a 80 y.o. female with a medical diagnosis of Encephalopathy, metabolic and presents with SELF CARE DEBILITY.    Rehab identified problem list/impairments: Rehab identified problem list/impairments: weakness, impaired endurance, impaired self care skills, impaired balance, gait instability, impaired functional mobilty, impaired cognition, decreased upper extremity function, decreased lower extremity function, decreased safety awareness    Rehab potential is good.    Activity tolerance: Fair    Discharge recommendations: Discharge Facility/Level of Care Needs: nursing facility, skilled     Barriers to discharge: Barriers to Discharge: None    Equipment recommendations: (TBD)     GOALS:   Multidisciplinary Problems     Occupational Therapy Goals        Problem: Occupational Therapy Goal    Goal Priority Disciplines Outcome Interventions   Occupational Therapy Goal     OT, PT/OT Ongoing, Progressing    Description:  OT goals to be met by 4-20-20  Min a with ue dressing  Pt will tolerate 1 set x 10 reps b  ue prom exercise  Min a with bsc t/f's                      Plan:  Patient to be seen 3 x/week to address the above listed problems via self-care/home management, therapeutic activities, therapeutic exercises  Plan of Care expires: 04/20/20  Plan of Care reviewed with: patient         Malena Davian OT  04/14/2020

## 2020-04-14 NOTE — PROGRESS NOTES
Pharmacokinetic Assessment Follow Up: IV Vancomycin    Vancomycin serum concentration assessment(s):    The random level was drawn correctly and can be used to guide therapy at this time. The measurement is within the desired definitive target range of 15 to 20 mcg/mL.    Vancomycin Regimen Plan:    Re-dose when the random level is less than 20 mcg/mL, next level to be drawn at 0430 on 4/15   Vancomycin random level=15.8mcg/ml. Patient can receive Vancomycin 1250mg (15mg/kg) one time dose.    Drug levels (last 3 results):  Recent Labs   Lab Result Units 04/14/20  0533   Vancomycin, Random ug/mL 15.8       Pharmacy will continue to follow and monitor vancomycin.    Please contact pharmacy at extension [phone] for questions regarding this assessment.    Thank you for the consult,   Parvin Jason       Patient brief summary:  Veronica Sue is a 80 y.o. female initiated on antimicrobial therapy with IV Vancomycin for treatment of bacteremia    The patient's current regimen is pulse dosing    Drug Allergies:   Review of patient's allergies indicates:   Allergen Reactions    Iodinated contrast media Swelling     Taken amiodorone    Levofloxacin     Minoxidil     Rosiglitazone        Actual Body Weight:   85.5kg    Renal Function:   Estimated Creatinine Clearance: 28.5 mL/min (A) (based on SCr of 1.7 mg/dL (H)).,     Dialysis Method (if applicable):  N/A    CBC (last 72 hours):  Recent Labs   Lab Result Units 04/11/20  1940/20  0430 04/13/20  0515 04/14/20  0533   WBC K/uL 6.31 9.46 6.58 7.59   Hemoglobin g/dL 12.6 11.9* 10.5* 10.0*   Hemoglobin A1C %  --  7.4*  --   --    Hematocrit % 40.7 38.1 33.5* 31.0*   Platelets K/uL 275 309 87* 208   Gran% % 67.6 82.4* 79.3*  --    Lymph% % 19.5 9.7* 10.5*  --    Mono% % 11.3 7.3 8.8  --    Eosinophil% % 1.1 0.1 0.8  --    Basophil% % 0.2 0.2 0.3  --    Differential Method  Automated Automated Automated  --        Metabolic Panel (last 72 hours):  Recent Labs   Lab  Result Units 04/1940 04/11/20 1945 04/12/20  0430 04/12/20  0917 04/13/20  0515 04/14/20  0533   Sodium mmol/L 138  --  140  140  --  141 141   Potassium mmol/L 4.5  --  3.9  3.9  --  3.8 3.4*   Chloride mmol/L 103  --  109  109  --  109 108   CO2 mmol/L 24  --  19*  19*  --  20* 24   Glucose mg/dL 359*  --  325*  325*  --  253* 218*   Glucose, UA   --  1+*  --   --   --   --    BUN, Bld mg/dL 23  --  20  20  --  18 23   Creatinine mg/dL 2.0*  --  1.5*  1.5*  --  1.4 1.7*   Albumin g/dL 3.7  --  3.4*  3.4*  --  3.1* 2.8*   Albumin grams/dl g/dL  --   --   --  3.42  --   --    Total Bilirubin mg/dL 0.6  --  0.5  --  0.5 0.5   Alkaline Phosphatase U/L 259*  --  242*  --  184* 160*   AST U/L 96*  --  86*  --  68* 58*   ALT U/L 71*  --  67*  --  50* 39   Magnesium mg/dL  --   --  1.5*  --  1.7 1.8   Phosphorus mg/dL  --   --  1.5*  --  2.2* 2.7       Vancomycin Administrations:  vancomycin given in the last 96 hours                   vancomycin (VANCOCIN) 2,250 mg in dextrose 5 % 500 mL IVPB (mg) 2,250 mg New Bag 04/13/20 0635                Microbiologic Results:  Microbiology Results (last 7 days)     Procedure Component Value Units Date/Time    Urine culture [[id number]]  (Abnormal)  (Susceptibility) Collected:  04/11/20 1945    Order Status:  Completed Specimen:  Urine Updated:  04/14/20 0828     Urine Culture, Routine ESCHERICHIA COLI  >100,000 cfu/ml      Narrative:       Preferred Collection Type->Urine, Clean Catch    Blood culture #1 **CANNOT BE ORDERED STAT** [[id number]] Collected:  04/11/20 2103    Order Status:  Completed Specimen:  Blood from Peripheral, Forearm, Left Updated:  04/14/20 0612     Blood Culture, Routine No Growth to date      No Growth to date      No Growth to date    Blood culture [[id number]] Collected:  04/13/20 0843    Order Status:  Completed Specimen:  Blood Updated:  04/13/20 2115     Blood Culture, Routine No Growth to date    Blood culture [[id number]] Collected:   04/13/20 0844    Order Status:  Completed Specimen:  Blood Updated:  04/13/20 2115     Blood Culture, Routine No Growth to date    Blood culture #2 **CANNOT BE ORDERED STAT** [[id number]] Collected:  04/11/20 2108    Order Status:  Completed Specimen:  Blood from Peripheral, Antecubital, Right Updated:  04/13/20 0450     Blood Culture, Routine Gram stain aer bottle: Gram positive cocci in clusters resembling Staph       Results called to and read back by:Delilah Link RN 04/13/2020  04:49    Influenza A & B by Molecular [[id number]] Collected:  04/11/20 1941    Order Status:  Completed Specimen:  Nasopharyngeal Swab Updated:  04/11/20 2017     Influenza A, Molecular Negative     Influenza B, Molecular Negative     Flu A & B Source Nasal swab

## 2020-04-14 NOTE — ASSESSMENT & PLAN NOTE
- Started on Rocephin   -Follow urine culture   -Urine culture resulted growth of E.coli sensitive to ceftriaxone

## 2020-04-14 NOTE — PLAN OF CARE
Pt only oriented to self. Pt unable to participate in neuro assessment. VSS. BG monitoring. NSR on monitor. Pt turned Q2 hours with wedge. Bed alarm set.

## 2020-04-14 NOTE — NURSING
Interventional Radiology:  Called floor nurse - Cindi, I notified her we will do biopsy tomorrow, sometime after 1000, make sure patient is kept NPO.

## 2020-04-15 ENCOUNTER — TELEPHONE (OUTPATIENT)
Dept: RADIOLOGY | Facility: HOSPITAL | Age: 80
End: 2020-04-15

## 2020-04-15 VITALS
SYSTOLIC BLOOD PRESSURE: 100 MMHG | RESPIRATION RATE: 16 BRPM | HEIGHT: 65 IN | BODY MASS INDEX: 31.4 KG/M2 | DIASTOLIC BLOOD PRESSURE: 60 MMHG | OXYGEN SATURATION: 98 % | TEMPERATURE: 98 F | HEART RATE: 66 BPM | WEIGHT: 188.5 LBS

## 2020-04-15 PROBLEM — N17.9 AKI (ACUTE KIDNEY INJURY): Status: ACTIVE | Noted: 2020-04-15

## 2020-04-15 PROBLEM — E87.8 DISORDER OF ELECTROLYTES: Status: RESOLVED | Noted: 2020-04-12 | Resolved: 2020-04-15

## 2020-04-15 PROBLEM — I10 ACCELERATED HYPERTENSION: Status: RESOLVED | Noted: 2020-04-11 | Resolved: 2020-04-15

## 2020-04-15 PROBLEM — N17.9 AKI (ACUTE KIDNEY INJURY): Status: RESOLVED | Noted: 2020-04-15 | Resolved: 2020-04-15

## 2020-04-15 PROBLEM — N30.00 ACUTE CYSTITIS WITHOUT HEMATURIA: Status: RESOLVED | Noted: 2020-04-11 | Resolved: 2020-04-15

## 2020-04-15 PROBLEM — G93.41 ENCEPHALOPATHY, METABOLIC: Status: RESOLVED | Noted: 2020-04-11 | Resolved: 2020-04-15

## 2020-04-15 LAB
ALBUMIN SERPL BCP-MCNC: 2.7 G/DL (ref 3.5–5.2)
ALP SERPL-CCNC: 175 U/L (ref 55–135)
ALT SERPL W/O P-5'-P-CCNC: 32 U/L (ref 10–44)
ANION GAP SERPL CALC-SCNC: 10 MMOL/L (ref 8–16)
AST SERPL-CCNC: 55 U/L (ref 10–40)
BACTERIA BLD CULT: ABNORMAL
BILIRUB SERPL-MCNC: 0.4 MG/DL (ref 0.1–1)
BNP SERPL-MCNC: 243 PG/ML (ref 0–99)
BUN SERPL-MCNC: 24 MG/DL (ref 8–23)
CALCIUM SERPL-MCNC: 10.7 MG/DL (ref 8.7–10.5)
CHLORIDE SERPL-SCNC: 109 MMOL/L (ref 95–110)
CO2 SERPL-SCNC: 23 MMOL/L (ref 23–29)
CREAT SERPL-MCNC: 1.9 MG/DL (ref 0.5–1.4)
EST. GFR  (AFRICAN AMERICAN): 28 ML/MIN/1.73 M^2
EST. GFR  (NON AFRICAN AMERICAN): 25 ML/MIN/1.73 M^2
GLUCOSE SERPL-MCNC: 174 MG/DL (ref 70–110)
MAGNESIUM SERPL-MCNC: 1.8 MG/DL (ref 1.6–2.6)
PHOSPHATE SERPL-MCNC: 2.5 MG/DL (ref 2.7–4.5)
POCT GLUCOSE: 178 MG/DL (ref 70–110)
POCT GLUCOSE: 229 MG/DL (ref 70–110)
POTASSIUM SERPL-SCNC: 3.4 MMOL/L (ref 3.5–5.1)
PROT SERPL-MCNC: 6.7 G/DL (ref 6–8.4)
SODIUM SERPL-SCNC: 142 MMOL/L (ref 136–145)

## 2020-04-15 PROCEDURE — 99233 SBSQ HOSP IP/OBS HIGH 50: CPT | Mod: ,,, | Performed by: INTERNAL MEDICINE

## 2020-04-15 PROCEDURE — 99233 PR SUBSEQUENT HOSPITAL CARE,LEVL III: ICD-10-PCS | Mod: ,,, | Performed by: INTERNAL MEDICINE

## 2020-04-15 PROCEDURE — 63600175 PHARM REV CODE 636 W HCPCS: Performed by: RADIOLOGY

## 2020-04-15 PROCEDURE — 25000003 PHARM REV CODE 250: Performed by: INTERNAL MEDICINE

## 2020-04-15 PROCEDURE — 88307 TISSUE EXAM BY PATHOLOGIST: CPT | Performed by: PATHOLOGY

## 2020-04-15 PROCEDURE — 84100 ASSAY OF PHOSPHORUS: CPT

## 2020-04-15 PROCEDURE — 63700000 PHARM REV CODE 250 ALT 637 W/O HCPCS: Performed by: INTERNAL MEDICINE

## 2020-04-15 PROCEDURE — 88342 IMHCHEM/IMCYTCHM 1ST ANTB: CPT | Performed by: PATHOLOGY

## 2020-04-15 PROCEDURE — 25000003 PHARM REV CODE 250: Performed by: RADIOLOGY

## 2020-04-15 PROCEDURE — 88341 IMHCHEM/IMCYTCHM EA ADD ANTB: CPT | Mod: 26,,, | Performed by: PATHOLOGY

## 2020-04-15 PROCEDURE — 88342 CHG IMMUNOCYTOCHEMISTRY: ICD-10-PCS | Mod: 26,,, | Performed by: PATHOLOGY

## 2020-04-15 PROCEDURE — 97116 GAIT TRAINING THERAPY: CPT

## 2020-04-15 PROCEDURE — 25000003 PHARM REV CODE 250: Performed by: NURSE PRACTITIONER

## 2020-04-15 PROCEDURE — 88341 PR IHC OR ICC EACH ADD'L SINGLE ANTIBODY  STAINPR: ICD-10-PCS | Mod: 26,,, | Performed by: PATHOLOGY

## 2020-04-15 PROCEDURE — 88341 IMHCHEM/IMCYTCHM EA ADD ANTB: CPT | Mod: 59 | Performed by: PATHOLOGY

## 2020-04-15 PROCEDURE — 88342 IMHCHEM/IMCYTCHM 1ST ANTB: CPT | Mod: 26,,, | Performed by: PATHOLOGY

## 2020-04-15 PROCEDURE — 83880 ASSAY OF NATRIURETIC PEPTIDE: CPT

## 2020-04-15 PROCEDURE — 88342 IMHCHEM/IMCYTCHM 1ST ANTB: CPT | Mod: 59 | Performed by: PATHOLOGY

## 2020-04-15 PROCEDURE — 36415 COLL VENOUS BLD VENIPUNCTURE: CPT

## 2020-04-15 PROCEDURE — 97110 THERAPEUTIC EXERCISES: CPT

## 2020-04-15 PROCEDURE — 83735 ASSAY OF MAGNESIUM: CPT

## 2020-04-15 PROCEDURE — 88341 IMHCHEM/IMCYTCHM EA ADD ANTB: CPT | Performed by: PATHOLOGY

## 2020-04-15 PROCEDURE — 88307 PR  SURG PATH,LEVEL V: ICD-10-PCS | Mod: 26,,, | Performed by: PATHOLOGY

## 2020-04-15 PROCEDURE — 80053 COMPREHEN METABOLIC PANEL: CPT

## 2020-04-15 PROCEDURE — 88307 TISSUE EXAM BY PATHOLOGIST: CPT | Mod: 26,,, | Performed by: PATHOLOGY

## 2020-04-15 RX ORDER — DOXAZOSIN 4 MG/1
4 TABLET ORAL DAILY
Qty: 30 TABLET | Refills: 0
Start: 2020-04-16 | End: 2020-05-16

## 2020-04-15 RX ORDER — FUROSEMIDE 20 MG/1
20 TABLET ORAL DAILY
Status: DISCONTINUED | OUTPATIENT
Start: 2020-04-15 | End: 2020-04-15

## 2020-04-15 RX ORDER — FUROSEMIDE 40 MG/1
40 TABLET ORAL DAILY
Qty: 30 TABLET | Refills: 0
Start: 2020-04-15 | End: 2020-05-15

## 2020-04-15 RX ORDER — FENTANYL CITRATE 50 UG/ML
INJECTION, SOLUTION INTRAMUSCULAR; INTRAVENOUS CODE/TRAUMA/SEDATION MEDICATION
Status: COMPLETED | OUTPATIENT
Start: 2020-04-15 | End: 2020-04-15

## 2020-04-15 RX ORDER — MIDAZOLAM HYDROCHLORIDE 1 MG/ML
INJECTION INTRAMUSCULAR; INTRAVENOUS CODE/TRAUMA/SEDATION MEDICATION
Status: COMPLETED | OUTPATIENT
Start: 2020-04-15 | End: 2020-04-15

## 2020-04-15 RX ORDER — SODIUM CHLORIDE 9 MG/ML
INJECTION, SOLUTION INTRAVENOUS CONTINUOUS
Status: DISCONTINUED | OUTPATIENT
Start: 2020-04-15 | End: 2020-04-15

## 2020-04-15 RX ORDER — LIDOCAINE HYDROCHLORIDE 10 MG/ML
INJECTION INFILTRATION; PERINEURAL CODE/TRAUMA/SEDATION MEDICATION
Status: COMPLETED | OUTPATIENT
Start: 2020-04-15 | End: 2020-04-15

## 2020-04-15 RX ORDER — BISACODYL 10 MG
10 SUPPOSITORY, RECTAL RECTAL DAILY PRN
Refills: 0 | COMMUNITY
Start: 2020-04-15 | End: 2020-05-15

## 2020-04-15 RX ORDER — CALCITONIN SALMON 200 [IU]/.09ML
1 SPRAY, METERED NASAL DAILY
Qty: 3.7 ML | Refills: 0 | Status: SHIPPED | OUTPATIENT
Start: 2020-04-15 | End: 2020-05-04 | Stop reason: SDUPTHER

## 2020-04-15 RX ORDER — GLIMEPIRIDE 2 MG/1
2 TABLET ORAL
Qty: 60 TABLET | Refills: 0
Start: 2020-04-15

## 2020-04-15 RX ORDER — POLYETHYLENE GLYCOL 3350 17 G/17G
17 POWDER, FOR SOLUTION ORAL DAILY
Qty: 238 G | Refills: 0 | Status: SHIPPED | OUTPATIENT
Start: 2020-04-16 | End: 2020-05-16

## 2020-04-15 RX ORDER — POTASSIUM CHLORIDE 20 MEQ/15ML
40 SOLUTION ORAL ONCE
Status: COMPLETED | OUTPATIENT
Start: 2020-04-15 | End: 2020-04-15

## 2020-04-15 RX ORDER — APIXABAN 2.5 MG/1
2.5 TABLET, FILM COATED ORAL 2 TIMES DAILY
Qty: 60 TABLET | Refills: 0
Start: 2020-04-17 | End: 2020-05-17

## 2020-04-15 RX ADMIN — FUROSEMIDE 20 MG: 20 TABLET ORAL at 08:04

## 2020-04-15 RX ADMIN — INSULIN ASPART 2 UNITS: 100 INJECTION, SOLUTION INTRAVENOUS; SUBCUTANEOUS at 12:04

## 2020-04-15 RX ADMIN — FENTANYL CITRATE 25 MCG: 0.05 INJECTION, SOLUTION INTRAMUSCULAR; INTRAVENOUS at 10:04

## 2020-04-15 RX ADMIN — LIDOCAINE HYDROCHLORIDE 5 ML: 10 INJECTION, SOLUTION INFILTRATION; PERINEURAL at 10:04

## 2020-04-15 RX ADMIN — NIFEDIPINE 90 MG: 30 TABLET, FILM COATED, EXTENDED RELEASE ORAL at 08:04

## 2020-04-15 RX ADMIN — ALPRAZOLAM 0.5 MG: 0.5 TABLET ORAL at 08:04

## 2020-04-15 RX ADMIN — POTASSIUM CHLORIDE 40 MEQ: 20 SOLUTION ORAL at 08:04

## 2020-04-15 RX ADMIN — METOPROLOL SUCCINATE 25 MG: 25 TABLET, EXTENDED RELEASE ORAL at 08:04

## 2020-04-15 RX ADMIN — POLYETHYLENE GLYCOL 3350 17 G: 17 POWDER, FOR SOLUTION ORAL at 08:04

## 2020-04-15 RX ADMIN — AMIODARONE HYDROCHLORIDE 200 MG: 200 TABLET ORAL at 08:04

## 2020-04-15 RX ADMIN — MIDAZOLAM HYDROCHLORIDE 0.5 MG: 1 INJECTION, SOLUTION INTRAMUSCULAR; INTRAVENOUS at 10:04

## 2020-04-15 RX ADMIN — PANTOPRAZOLE SODIUM 40 MG: 40 TABLET, DELAYED RELEASE ORAL at 08:04

## 2020-04-15 RX ADMIN — LEVOTHYROXINE SODIUM 88 MCG: 88 TABLET ORAL at 06:04

## 2020-04-15 RX ADMIN — DOXAZOSIN 4 MG: 2 TABLET ORAL at 08:04

## 2020-04-15 RX ADMIN — HYDRALAZINE HYDROCHLORIDE 50 MG: 50 TABLET, FILM COATED ORAL at 08:04

## 2020-04-15 NOTE — PLAN OF CARE
Discussed poc with pt, pt demonstrates and verbalizes understanding, needs reinforcement r/t condition    Strict no visitor policy per CDC guidelines   Pt is DNR, plans are to be discharged with hospice care    SDM updated in chart    VS wnl  Cardiac monitoring in use, pt is NSR, tele monitor # [id number]  Blood glucose monitoring requiring insulin coverage    Fall precautions in place, remains injury free  Pt denies c/o nausea  Pain under control with repositioning and removed SCDs  Arguello discontinued earlier, pt is due to void  CT-liver biopsy performed, inc site WNL  Accurate I&Os  Neuro checks WNL    Bed locked at lowest position  Call light within reach    Chart check complete  Will cont to monitor

## 2020-04-15 NOTE — SUBJECTIVE & OBJECTIVE
Interval History: Plan is to discharge home following liver biopsy today with hospice per family request.     Oncology Treatment Plan:   [No treatment plan]    Medications:  Continuous Infusions:  Scheduled Meds:   ALPRAZolam  0.5 mg Oral BID    amiodarone  200 mg Oral Daily    cefTRIAXone (ROCEPHIN) IVPB  1 g Intravenous Q24H    doxazosin  4 mg Oral Daily    furosemide  20 mg Oral Daily    hydrALAZINE  50 mg Oral BID    levothyroxine  88 mcg Oral Before breakfast    metoprolol succinate  25 mg Oral Daily    NIFEdipine  90 mg Oral Daily    pantoprazole  40 mg Oral Daily    polyethylene glycol  17 g Oral Daily    simvastatin  40 mg Oral QHS     PRN Meds:bisacodyL, dextrose 10 % in water (D10W), dextrose 10 % in water (D10W), glucagon (human recombinant), glucose, glucose, hydrALAZINE, insulin aspart U-100, ondansetron, ondansetron, sodium chloride 0.9%     Review of Systems   Unable to perform ROS: Other     Objective:     Vital Signs (Most Recent):  Temp: 97.7 °F (36.5 °C) (04/15/20 0716)  Pulse: 69 (04/15/20 0716)  Resp: 14 (04/15/20 0716)  BP: (!) 160/88 (04/15/20 0716)  SpO2: 96 % (04/15/20 0716) Vital Signs (24h Range):  Temp:  [97 °F (36.1 °C)-97.7 °F (36.5 °C)] 97.7 °F (36.5 °C)  Pulse:  [61-72] 69  Resp:  [14-19] 14  SpO2:  [92 %-97 %] 96 %  BP: (138-175)/(64-88) 160/88     Weight: 85.5 kg (188 lb 7.9 oz)  Body mass index is 31.37 kg/m².  Body surface area is 1.98 meters squared.      Intake/Output Summary (Last 24 hours) at 4/15/2020 0977  Last data filed at 4/15/2020 0346  Gross per 24 hour   Intake 1987.5 ml   Output 750 ml   Net 1237.5 ml       Physical Exam   Constitutional: She appears lethargic. She appears cachectic. She appears ill. No distress.   HENT:   Head: Normocephalic and atraumatic.   Right Ear: External ear normal.   Left Ear: External ear normal.   Nose: No rhinorrhea or sinus tenderness. Right sinus exhibits no maxillary sinus tenderness and no frontal sinus tenderness. Left  sinus exhibits no maxillary sinus tenderness and no frontal sinus tenderness.   Mouth/Throat: Uvula is midline, oropharynx is clear and moist and mucous membranes are normal. No oral lesions.   Eyes: Pupils are equal, round, and reactive to light. Conjunctivae are normal. Right eye exhibits no discharge. Left eye exhibits no discharge.   Neck: Normal range of motion. Carotid bruit is not present. No tracheal deviation present. No thyromegaly present.   Cardiovascular: Normal rate, regular rhythm, S1 normal, S2 normal, normal heart sounds and intact distal pulses.   No murmur heard.  Pulses:       Dorsalis pedis pulses are 2+ on the right side, and 2+ on the left side.   Pulmonary/Chest: Effort normal. No respiratory distress. She has decreased breath sounds in the right lower field and the left lower field.   Abdominal: Soft. Bowel sounds are normal. She exhibits no distension and no mass. There is no tenderness.   Musculoskeletal: Normal range of motion.   Lymphadenopathy:     She has no cervical adenopathy.        Right: No supraclavicular adenopathy present.        Left: No supraclavicular adenopathy present.   Neurological: She appears lethargic.   Skin: Skin is warm and dry. There is pallor.   Psychiatric: Cognition and memory are impaired.   Nursing note and vitals reviewed.      Significant Labs:   CBC:   Recent Labs   Lab 04/14/20  0533   WBC 7.59   HGB 10.0*   HCT 31.0*       and CMP:   Recent Labs   Lab 04/14/20  0533 04/15/20  0427    142   K 3.4* 3.4*    109   CO2 24 23   * 174*   BUN 23 24*   CREATININE 1.7* 1.9*   CALCIUM 11.3* 10.7*   PROT 6.0 6.7   ALBUMIN 2.8* 2.7*   BILITOT 0.5 0.4   ALKPHOS 160* 175*   AST 58* 55*   ALT 39 32   ANIONGAP 9 10   EGFRNONAA 28* 25*       Diagnostic Results:  I have reviewed all pertinent imaging results/findings within the past 24 hours.

## 2020-04-15 NOTE — ASSESSMENT & PLAN NOTE
[facility] hypodense liver lesions on US, unable to complete CT due to poor kidney function. Plan is for biopsy per IR on Wednesday, Eliquis has been held to prepare for procedure.   --Due to comorbid conditions and overall status, patient would likely not be a candidate for treatment. In consideration of this, patient would be a good candidate for hospice. PM discussed goals of care with the family, family desires to have the biopsy completed and then discharge home on hospice.     --Continue supportive care  --Will review results when available

## 2020-04-15 NOTE — PROGRESS NOTES
[facility] -   Nephrology  Progress Note    Patient Name: Veronica Sue  MRN: [id number]  Admission Date: 4/11/2020  Hospital Length of Stay: 4 days  Attending Provider: Nina Pink MD   Primary Care Physician: Jerry Amaro MD  Principal Problem:Encephalopathy, metabolic    Subjective:     HPI: 80 year old female with arthritis, atrial fibrillation, CHF, CKD 4, CAD, DM2, LE DVT, HTN presents to [facility] [place] [facility] with weakness, nausea and vomiting, constipation.   Work-up revealed severe hypercalcemia.    Nephrology was consulted to help with patient's renal care while she is admitted at [facility]. I saw and examined patient in her hospital room. Patient is complaining of mild abdominal pain, nausea/vomiting, constipation, dysuria. No chest pain, SOB, LE edema, fever.     Chart review revealed that patient suffers drom CKD 4 with baseline creatinine of about 2. Last Ca from 11/23/19 was 9.4.     Interval History:     4/13/20: patient is resting comfortably, no issues at present. Hypercalcemia has improved with calcium declining to 10.9.     4/14/20: Calcium has started to rise again to 11.3 (albumin 2.8). Creatinine has increased to 1.7 today.     4/15/20: Calcium has declined to 10.7 (albumin 2.7). Creatinine at 1.9 today. She had CT guided biopsy of liver lesions today.         Review of patient's allergies indicates:   Allergen Reactions    Iodinated contrast media Swelling     Taken amiodorone    Levofloxacin     Minoxidil     Rosiglitazone      Current Facility-Administered Medications   Medication Frequency    ALPRAZolam tablet 0.5 mg BID    amiodarone tablet 200 mg Daily    bisacodyL suppository 10 mg Daily PRN    cefTRIAXone (ROCEPHIN) 1 g in dextrose 5 % 50 mL IVPB Q24H    dextrose 10 % infusion PRN    dextrose 10 % infusion PRN    doxazosin tablet 4 mg Daily    furosemide tablet 20 mg Daily    glucagon (human recombinant) injection 1 mg PRN    glucose chewable tablet 16 g PRN    glucose  chewable tablet 24 g PRN    hydrALAZINE injection 10 mg Q8H PRN    hydrALAZINE tablet 50 mg BID    insulin aspart U-100 pen 0-5 Units QID (AC + HS) PRN    levothyroxine tablet 88 mcg Before breakfast    metoprolol succinate (TOPROL-XL) 24 hr tablet 25 mg Daily    NIFEdipine 24 hr tablet 90 mg Daily    ondansetron disintegrating tablet 4 mg Q6H PRN    ondansetron injection 4 mg Q8H PRN    pantoprazole EC tablet 40 mg Daily    polyethylene glycol packet 17 g Daily    simvastatin tablet 40 mg QHS    sodium chloride 0.9% flush 10 mL PRN       Objective:     Vital Signs (Most Recent):  Temp: 97.7 °F (36.5 °C) (04/15/20 0716)  Pulse: 66 (04/15/20 1215)  Resp: 16 (04/15/20 1215)  BP: 100/60 (04/15/20 1215)  SpO2: 98 % (04/15/20 1215)  O2 Device (Oxygen Therapy): room air (04/15/20 1115) Vital Signs (24h Range):  Temp:  [97 °F (36.1 °C)-97.7 °F (36.5 °C)] 97.7 °F (36.5 °C)  Pulse:  [60-72] 66  Resp:  [14-18] 16  SpO2:  [92 %-98 %] 98 %  BP: (100-220)/() 100/60     Weight: 85.5 kg (188 lb 7.9 oz) (04/11/20 2308)  Body mass index is 31.37 kg/m².  Body surface area is 1.98 meters squared.    I/O last 3 completed shifts:  In: 2525 [I.V.:2425; IV Piggyback:100]  Out: 1000 [Urine:1000]    Physical Exam   Constitutional: She appears well-developed and well-nourished.   HENT:   Head: Normocephalic.   Eyes: Pupils are equal, round, and reactive to light.   Neck: No thyromegaly present.   Cardiovascular: Normal rate and regular rhythm. Exam reveals no friction rub.   Pulmonary/Chest: Effort normal and breath sounds normal. She has no wheezes. She exhibits no tenderness.   Abdominal: Soft. Bowel sounds are normal. She exhibits no distension. There is no tenderness.   Musculoskeletal: She exhibits no edema.   Lymphadenopathy:     She has no cervical adenopathy.   Neurological: She is alert.   Skin: Skin is warm and dry. No rash noted.       Significant Labs:  Lab Results   Component Value Date    CREATININE 1.9 (H)  04/15/2020    BUN 24 (H) 04/15/2020     04/15/2020    K 3.4 (L) 04/15/2020     04/15/2020    CO2 23 04/15/2020     Lab Results   Component Value Date    PTH 5.9 (L) 04/12/2020    CALCIUM 10.7 (H) 04/15/2020    PHOS 2.5 (L) 04/15/2020     Lab Results   Component Value Date    ALBUMIN 2.7 (L) 04/15/2020     Lab Results   Component Value Date    WBC 6.58 04/13/2020    HGB 10.5 (L) 04/13/2020    HCT 33.5 (L) 04/13/2020    MCV 90 04/13/2020    PLT 87 (L) 04/13/2020       Recent Labs   Lab 04/15/20  0427   MG 1.8     PTHrp: pending    SPEP: negative.     Significant Imaging:  Imaging Results          US Abdomen Limited (Final result)  Result time 04/12/20 15:44:10    Final result by Gerry Mckenzie MD (04/12/20 15:44:10)                 Impression:      1.  Abnormal study.  There are too numerous to count hepatic masses measuring up to 3.1 cm, concerning for metastasis and less likely multiple focal primary neoplasm.    2.  Borderline splenomegaly.  The splenic calcifications, possibly related to old granulomatous disease.    3.  Small left kidney with cortical thinning.  Medical renal disease could have this appearance.  Clinical correlation is advised.    4.  Incidental findings as noted above.  Negative for acute process otherwise.      Electronically signed by: Gerry Mckenzie MD  Date:    04/12/2020  Time:    15:44             Narrative:    EXAMINATION:  US ABDOMEN LIMITED, color flow and spectral Doppler interrogation    CLINICAL HISTORY:  Hypercalcemia of unknown origin; elevated GGT; mildly elevated LFTs, suspicious for malignancy.;    COMPARISON:  No comparison studies are available.    FINDINGS:  The liver is heterogeneous in material with multiple hypodense lesions measuring up to 3.1 cm.  There is fatty infiltration of the liver as well.  The liver measures 17 cm. The gallbladder is normal. Negative for a sonographic Moreira's sign.  The common duct measures 3 mm. The right kidney measures 9 cm.   There is cortical thinning of the right kidney.  The right kidney is without focal solid or cystic masses, hydronephrosis, perinephric fluid collections or shadowing stones. The spleen measures 11.0 cm and is normal in appearance.  The visualized portions of the pancreas are normal.  There are calcifications in the spleen, likely related to calcified granulomas.  The aorta and IVC are normal in caliber.  Hepatopedal flow is documented in the portal vein.  The flow velocity in the hepatic vein is 17 centimeters/second.                               CT Head Without Contrast (Final result)  Result time 04/11/20 20:49:18    Final result by Calvin Seth MD (04/11/20 20:49:18)                 Impression:      No CT evidence of an acute intracranial process.  Age-appropriate cerebral atrophy with prominent symmetric bifrontal extra-axial fluid.    All CT scans at this facility are performed  using dose modulation techniques as appropriate to performed exam including the following:  automated exposure control; adjustment of mA and/or kV according to the patients size (this includes techniques or standardized protocols for targeted exams where dose is matched to indication/reason for exam: i.e. extremities or head);  iterative reconstruction technique.      Electronically signed by: Calvin Seth MD  Date:    04/11/2020  Time:    20:49             Narrative:    EXAMINATION:  CT HEAD WITHOUT CONTRAST    CLINICAL HISTORY:  Confusion/delirium, altered LOC, unexplained;    TECHNIQUE:  Routine noncontrast head CT.    COMPARISON:  None    FINDINGS:  There is no acute intracranial hemorrhage or abnormal extra-axial fluid collection.  There is advanced cerebral atrophy with bilateral symmetric bifrontal extra-axial fluid.  Ventricular-sulcal congruence.  There is no abnormal increased or decreased density within the brain parenchyma.  Gray-white differentiation preserved.  There is no intracranial mass or mass effect.  The  calvarium is intact.  Visualized paranasal sinuses and mastoids are well aerated.                               X-Ray Chest AP Portable (Final result)  Result time 04/11/20 20:24:18    Final result by Calvin Seth MD (04/11/20 20:24:18)                 Impression:      No acute process.      Electronically signed by: Calvin Seth MD  Date:    04/11/2020  Time:    20:24             Narrative:    EXAMINATION:  XR CHEST AP PORTABLE    CLINICAL HISTORY:  Transient alteration of awareness, unspecified    FINDINGS:  No prior study.  Normal size heart.  Aortic arch calcification.  No congestion.  Lungs are clear.  Multilevel anterior cervical fusion.                                  Assessment/Plan:     LATRICE (acute kidney injury)  Creatinine has increased to 1.9 today. Will hold Lasix and start m    Hypercalcemia of malignancy  Patient presented with constipation, abdominal pain, MS change (as per family) which are typical hypercalcemia symptoms.  No clear etiology. PTH appropriately suppressed. PTHrp are pending. Vitamin D level not elevated. SPEP did not show any paraproteins. S/p IV fluids and lasix. Calcium has decreased to 10.7 today (albumin 2.7). Patient received Zometa yesterday. Work-up revealed liver masses suggestive of liver cancer. Patient may suffer from hypercalcemia due to cancer-producing PTHrp.       Acute cystitis without hematuria  Continue antibiotics.         Thank you for your consult. I will follow-up with patient. Please contact us if you have any additional questions.    Reed Bledsoe MD  Nephrology  [facility] -

## 2020-04-15 NOTE — SUBJECTIVE & OBJECTIVE
Interval History:     4/13/20: patient is resting comfortably, no issues at present. Hypercalcemia has improved with calcium declining to 10.9.     4/14/20: Calcium has started to rise again to 11.3 (albumin 2.8). Creatinine has increased to 1.7 today.     4/15/20: Calcium has declined to 10.7 (albumin 2.7). Creatinine at 1.9 today. She had CT guided biopsy of liver lesions today.         Review of patient's allergies indicates:   Allergen Reactions    Iodinated contrast media Swelling     Taken amiodorone    Levofloxacin     Minoxidil     Rosiglitazone      Current Facility-Administered Medications   Medication Frequency    ALPRAZolam tablet 0.5 mg BID    amiodarone tablet 200 mg Daily    bisacodyL suppository 10 mg Daily PRN    cefTRIAXone (ROCEPHIN) 1 g in dextrose 5 % 50 mL IVPB Q24H    dextrose 10 % infusion PRN    dextrose 10 % infusion PRN    doxazosin tablet 4 mg Daily    furosemide tablet 20 mg Daily    glucagon (human recombinant) injection 1 mg PRN    glucose chewable tablet 16 g PRN    glucose chewable tablet 24 g PRN    hydrALAZINE injection 10 mg Q8H PRN    hydrALAZINE tablet 50 mg BID    insulin aspart U-100 pen 0-5 Units QID (AC + HS) PRN    levothyroxine tablet 88 mcg Before breakfast    metoprolol succinate (TOPROL-XL) 24 hr tablet 25 mg Daily    NIFEdipine 24 hr tablet 90 mg Daily    ondansetron disintegrating tablet 4 mg Q6H PRN    ondansetron injection 4 mg Q8H PRN    pantoprazole EC tablet 40 mg Daily    polyethylene glycol packet 17 g Daily    simvastatin tablet 40 mg QHS    sodium chloride 0.9% flush 10 mL PRN       Objective:     Vital Signs (Most Recent):  Temp: 97.7 °F (36.5 °C) (04/15/20 0716)  Pulse: 66 (04/15/20 1215)  Resp: 16 (04/15/20 1215)  BP: 100/60 (04/15/20 1215)  SpO2: 98 % (04/15/20 1215)  O2 Device (Oxygen Therapy): room air (04/15/20 1115) Vital Signs (24h Range):  Temp:  [97 °F (36.1 °C)-97.7 °F (36.5 °C)] 97.7 °F (36.5 °C)  Pulse:  [60-72]  66  Resp:  [14-18] 16  SpO2:  [92 %-98 %] 98 %  BP: (100-220)/() 100/60     Weight: 85.5 kg (188 lb 7.9 oz) (04/11/20 2308)  Body mass index is 31.37 kg/m².  Body surface area is 1.98 meters squared.    I/O last 3 completed shifts:  In: 2525 [I.V.:2425; IV Piggyback:100]  Out: 1000 [Urine:1000]    Physical Exam   Constitutional: She appears well-developed and well-nourished.   HENT:   Head: Normocephalic.   Eyes: Pupils are equal, round, and reactive to light.   Neck: No thyromegaly present.   Cardiovascular: Normal rate and regular rhythm. Exam reveals no friction rub.   Pulmonary/Chest: Effort normal and breath sounds normal. She has no wheezes. She exhibits no tenderness.   Abdominal: Soft. Bowel sounds are normal. She exhibits no distension. There is no tenderness.   Musculoskeletal: She exhibits no edema.   Lymphadenopathy:     She has no cervical adenopathy.   Neurological: She is alert.   Skin: Skin is warm and dry. No rash noted.       Significant Labs:  Lab Results   Component Value Date    CREATININE 1.9 (H) 04/15/2020    BUN 24 (H) 04/15/2020     04/15/2020    K 3.4 (L) 04/15/2020     04/15/2020    CO2 23 04/15/2020     Lab Results   Component Value Date    PTH 5.9 (L) 04/12/2020    CALCIUM 10.7 (H) 04/15/2020    PHOS 2.5 (L) 04/15/2020     Lab Results   Component Value Date    ALBUMIN 2.7 (L) 04/15/2020     Lab Results   Component Value Date    WBC 6.58 04/13/2020    HGB 10.5 (L) 04/13/2020    HCT 33.5 (L) 04/13/2020    MCV 90 04/13/2020    PLT 87 (L) 04/13/2020       Recent Labs   Lab 04/15/20  0427   MG 1.8     PTHrp: pending    SPEP: negative.     Significant Imaging:  Imaging Results          US Abdomen Limited (Final result)  Result time 04/12/20 15:44:10    Final result by Gerry Mckenzie MD (04/12/20 15:44:10)                 Impression:      1.  Abnormal study.  There are too numerous to count hepatic masses measuring up to 3.1 cm, concerning for metastasis and less likely  multiple focal primary neoplasm.    2.  Borderline splenomegaly.  The splenic calcifications, possibly related to old granulomatous disease.    3.  Small left kidney with cortical thinning.  Medical renal disease could have this appearance.  Clinical correlation is advised.    4.  Incidental findings as noted above.  Negative for acute process otherwise.      Electronically signed by: Gerry Mckenzie MD  Date:    04/12/2020  Time:    15:44             Narrative:    EXAMINATION:  US ABDOMEN LIMITED, color flow and spectral Doppler interrogation    CLINICAL HISTORY:  Hypercalcemia of unknown origin; elevated GGT; mildly elevated LFTs, suspicious for malignancy.;    COMPARISON:  No comparison studies are available.    FINDINGS:  The liver is heterogeneous in material with multiple hypodense lesions measuring up to 3.1 cm.  There is fatty infiltration of the liver as well.  The liver measures 17 cm. The gallbladder is normal. Negative for a sonographic Moreira's sign.  The common duct measures 3 mm. The right kidney measures 9 cm.  There is cortical thinning of the right kidney.  The right kidney is without focal solid or cystic masses, hydronephrosis, perinephric fluid collections or shadowing stones. The spleen measures 11.0 cm and is normal in appearance.  The visualized portions of the pancreas are normal.  There are calcifications in the spleen, likely related to calcified granulomas.  The aorta and IVC are normal in caliber.  Hepatopedal flow is documented in the portal vein.  The flow velocity in the hepatic vein is 17 centimeters/second.                               CT Head Without Contrast (Final result)  Result time 04/11/20 20:49:18    Final result by Calvin Seth MD (04/11/20 20:49:18)                 Impression:      No CT evidence of an acute intracranial process.  Age-appropriate cerebral atrophy with prominent symmetric bifrontal extra-axial fluid.    All CT scans at this facility are performed  using  dose modulation techniques as appropriate to performed exam including the following:  automated exposure control; adjustment of mA and/or kV according to the patients size (this includes techniques or standardized protocols for targeted exams where dose is matched to indication/reason for exam: i.e. extremities or head);  iterative reconstruction technique.      Electronically signed by: Calvin Seth MD  Date:    04/11/2020  Time:    20:49             Narrative:    EXAMINATION:  CT HEAD WITHOUT CONTRAST    CLINICAL HISTORY:  Confusion/delirium, altered LOC, unexplained;    TECHNIQUE:  Routine noncontrast head CT.    COMPARISON:  None    FINDINGS:  There is no acute intracranial hemorrhage or abnormal extra-axial fluid collection.  There is advanced cerebral atrophy with bilateral symmetric bifrontal extra-axial fluid.  Ventricular-sulcal congruence.  There is no abnormal increased or decreased density within the brain parenchyma.  Gray-white differentiation preserved.  There is no intracranial mass or mass effect.  The calvarium is intact.  Visualized paranasal sinuses and mastoids are well aerated.                               X-Ray Chest AP Portable (Final result)  Result time 04/11/20 20:24:18    Final result by Calvin Seth MD (04/11/20 20:24:18)                 Impression:      No acute process.      Electronically signed by: Calvin Seth MD  Date:    04/11/2020  Time:    20:24             Narrative:    EXAMINATION:  XR CHEST AP PORTABLE    CLINICAL HISTORY:  Transient alteration of awareness, unspecified    FINDINGS:  No prior study.  Normal size heart.  Aortic arch calcification.  No congestion.  Lungs are clear.  Multilevel anterior cervical fusion.

## 2020-04-15 NOTE — PLAN OF CARE
Placed a secure chat to Dr. Pink letting her know son has several questions - I gave his name and number

## 2020-04-15 NOTE — PLAN OF CARE
Contacted Muna Baker, patient's primary caregiver and grandchild @ [phone]. Confirmed that they would like to use [facility]. Preference letter obtained. Contacted Marlene, Carlitos at [facility] @ [phone]. Discussed referral with Marlene. Faxed referral via [facility] to [facility]. She will have their admit nurse contact Muna for the informational visit, consents and obtain list of HME needed. Once everything has been arranged Marlene will call case management for discharge orders.         04/15/20 1039   Post-Acute Status   Post-Acute Authorization Home Health   Home Health Status Referrals Sent   Discharge Delays (!) Other

## 2020-04-15 NOTE — PT/OT/SLP PROGRESS
Physical Therapy  Treatment    Veronica Sue   MRN: [id number]   Admitting Diagnosis: Encephalopathy, metabolic    PT Received On: 04/15/20  PT Start Time: 0825     PT Stop Time: 0855    PT Total Time (min): 30 min       Billable Minutes:  Gait Training 15 and Therapeutic Exercise 15    Treatment Type: Treatment  PT/PTA: PT     PTA Visit Number: 0       General Precautions: Standard, fall  Orthopedic Precautions: N/A   Braces: N/A         Subjective:  Communicated with nurse SHARRI AND Epic CHART REVIEW  prior to session.   PT AGREED TO TX     Pain/Comfort  Pain Rating 1: 3/10  Location 1: head  Pain Rating Post-Intervention 1: 3/10    Objective:   Patient found with: telemetry, peripheral IV    Functional Mobility:  PT MET IN RM SUP>SIT EOB WITH MOD A AND SCOOTED TO EOB WITH MIN A. PT SEATED FOR B LE TE X 10 REPS OF AP, TKE, MIP. PT SEATED TO TAKE MEDS WITH NURSE. PT STOOD WITH RW AND GT TRAINED X 40' WITH STEP TO GT AND MOD A WITH CUES FOR POSTURE. PT RETURNED TO RM T/F TO CHAIR WITH MOD A. PT LEFT SEATED IN CHAIR WITH ALL NEEDS MET AND CALL BELL IN REACH.     AM-PAC 6 CLICK MOBILITY  How much help from another person does this patient currently need?   1 = Unable, Total/Dependent Assistance  2 = A lot, Maximum/Moderate Assistance  3 = A little, Minimum/Contact Guard/Supervision  4 = None, Modified Frio/Independent    Turning over in bed (including adjusting bedclothes, sheets and blankets)?: 3  Sitting down on and standing up from a chair with arms (e.g., wheelchair, bedside commode, etc.): 2  Moving from lying on back to sitting on the side of the bed?: 3  Moving to and from a bed to a chair (including a wheelchair)?: 2  Need to walk in hospital room?: 2  Climbing 3-5 steps with a railing?: 1  Basic Mobility Total Score: 13    AM-PAC Raw Score CMS G-Code Modifier Level of Impairment Assistance   6 % Total / Unable   7 - 9 CM 80 - 100% Maximal Assist   10 - 14 CL 60 - 80% Moderate Assist   15 - 19  CK 40 - 60% Moderate Assist   20 - 22 CJ 20 - 40% Minimal Assist   23 CI 1-20% SBA / CGA   24 CH 0% Independent/ Mod I     Patient left up in chair with call button in reach and chair alarm on.    Assessment:  PT PROGRESSING WITH GT TRAINING.     Rehab identified problem list/impairments: Rehab identified problem list/impairments: weakness, gait instability, impaired balance, impaired endurance, impaired functional mobilty, impaired self care skills, decreased lower extremity function, decreased ROM, decreased safety awareness, pain    Rehab potential is good.    Activity tolerance: Fair    Discharge recommendations: Discharge Facility/Level of Care Needs: nursing facility, skilled     Barriers to discharge:      Equipment recommendations: Equipment Needed After Discharge: (TBD)     GOALS:   Multidisciplinary Problems     Physical Therapy Goals        Problem: Physical Therapy Goal    Goal Priority Disciplines Outcome Goal Variances Interventions   Physical Therapy Goal     PT, PT/OT Ongoing, Progressing     Description:  PT WILL BE SEEN FOR P.T. FOR A MIN OF 5 OUT OF 7 DAYS A WEEK  LT20  1. PT WILL COMPLETE BED MOBILITY WITH MOD A  2. PT WILL T/F TO CHAIR WITH RW AND MOD A  3. PT WILL COMPLETE GT TRAINING X 20' WITH RW AND MOD A  4. PT WILL COMPLETE B LE TE X 20 REPS                    PLAN:    Patient to be seen 5 x/week  to address the above listed problems via gait training, therapeutic activities, therapeutic exercises  Plan of Care expires: 20  Plan of Care reviewed with: patient         Saray Osei, PT  04/15/2020

## 2020-04-15 NOTE — PLAN OF CARE
Contacted Yulia on call nurse at [facility]. She stated that patient's son Rajiv changed his mind and would rather wait and contact patient's pcp Dr Amaro to obtain a hospice referral when he and other family members are ready. Contacted josephine Mccollum and she confirmed that her uncle did change his mind. She believes that the hospice nurse scared him and requested that provider contact him. She stated that patient will discharge with family today no needs. Discussed with Dr Pink and requested that she contact patient's son.         04/15/20 1513   Post-Acute Status   Post-Acute Authorization Hospice   Hospice Status Patient/Family Changed Mind   Discharge Delays (!) Other

## 2020-04-15 NOTE — INTERVAL H&P NOTE
The patient has been examined and the H&P has been reviewed:    I concur with the findings and no changes have occurred since H&P was written.        Active Hospital Problems    Diagnosis  POA    *Encephalopathy, metabolic [G93.41]  Yes    Hepatic lesions / masses  [K76.9]  Yes    Atrial fibrillation [I48.91]  Yes     Chronic    Diabetes [E11.9]  Yes    CKD (chronic kidney disease) stage 4, GFR 15-29 ml/min [N18.4]  Yes    DVT, lower extremity [I82.409]  Yes    Disorder of electrolytes [E87.8]  Yes    Acute cystitis without hematuria [N30.00]  Yes    Hypercalcemia of malignancy [E83.52]  Yes    Accelerated hypertension [I10]  Yes    Diastolic heart failure [I50.30]  Yes      Resolved Hospital Problems   No resolved problems to display.

## 2020-04-15 NOTE — PLAN OF CARE
Spoke to Muna Baker , primary care giver. Patient is to discharge home with [facility] later today.       04/15/20 1045   Medicare Message   Important Message from Medicare regarding Discharge Appeal Rights Other (comments)  (No IMM needed patient to discharge to hospice)   Date IMM was signed 04/15/20   Time IMM was signed 1046

## 2020-04-15 NOTE — PROGRESS NOTES
[facility] -   Hematology/Oncology  Progress Note    Patient Name: Veronica Sue  Admission Date: 4/11/2020  Hospital Length of Stay: 4 days  Code Status: DNR     Subjective:     HPI:  80 year old female with arthritis, atrial fibrillation, CHF, CKD 4, CAD, DM2, LE DVT, HTN [place] [facility] with weakness, nausea and vomiting, constipation.   Work-up revealed severe hypercalcemia and numerous hypodense liver lesions, largest measures 3.1 cm, CT not completed due to impaired renal function. Hem/Onc consulted due to lever lesions and hypercalcemia. Patient is scheduled for liver biopsy with IR for Wednesday.     Interval History: Plan is to discharge home following liver biopsy today with hospice per family request.     Oncology Treatment Plan:   [No treatment plan]    Medications:  Continuous Infusions:  Scheduled Meds:   ALPRAZolam  0.5 mg Oral BID    amiodarone  200 mg Oral Daily    cefTRIAXone (ROCEPHIN) IVPB  1 g Intravenous Q24H    doxazosin  4 mg Oral Daily    furosemide  20 mg Oral Daily    hydrALAZINE  50 mg Oral BID    levothyroxine  88 mcg Oral Before breakfast    metoprolol succinate  25 mg Oral Daily    NIFEdipine  90 mg Oral Daily    pantoprazole  40 mg Oral Daily    polyethylene glycol  17 g Oral Daily    simvastatin  40 mg Oral QHS     PRN Meds:bisacodyL, dextrose 10 % in water (D10W), dextrose 10 % in water (D10W), glucagon (human recombinant), glucose, glucose, hydrALAZINE, insulin aspart U-100, ondansetron, ondansetron, sodium chloride 0.9%     Review of Systems   Unable to perform ROS: Other     Objective:     Vital Signs (Most Recent):  Temp: 97.7 °F (36.5 °C) (04/15/20 0716)  Pulse: 69 (04/15/20 0716)  Resp: 14 (04/15/20 0716)  BP: (!) 160/88 (04/15/20 0716)  SpO2: 96 % (04/15/20 0716) Vital Signs (24h Range):  Temp:  [97 °F (36.1 °C)-97.7 °F (36.5 °C)] 97.7 °F (36.5 °C)  Pulse:  [61-72] 69  Resp:  [14-19] 14  SpO2:  [92 %-97 %] 96 %  BP: (138-175)/(64-88) 160/88      Weight: 85.5 kg (188 lb 7.9 oz)  Body mass index is 31.37 kg/m².  Body surface area is 1.98 meters squared.      Intake/Output Summary (Last 24 hours) at 4/15/2020 0957  Last data filed at 4/15/2020 0346  Gross per 24 hour   Intake 1987.5 ml   Output 750 ml   Net 1237.5 ml       Physical Exam   Constitutional: She appears lethargic. She appears cachectic. She appears ill. No distress.   HENT:   Head: Normocephalic and atraumatic.   Right Ear: External ear normal.   Left Ear: External ear normal.   Nose: No rhinorrhea or sinus tenderness. Right sinus exhibits no maxillary sinus tenderness and no frontal sinus tenderness. Left sinus exhibits no maxillary sinus tenderness and no frontal sinus tenderness.   Mouth/Throat: Uvula is midline, oropharynx is clear and moist and mucous membranes are normal. No oral lesions.   Eyes: Pupils are equal, round, and reactive to light. Conjunctivae are normal. Right eye exhibits no discharge. Left eye exhibits no discharge.   Neck: Normal range of motion. Carotid bruit is not present. No tracheal deviation present. No thyromegaly present.   Cardiovascular: Normal rate, regular rhythm, S1 normal, S2 normal, normal heart sounds and intact distal pulses.   No murmur heard.  Pulses:       Dorsalis pedis pulses are 2+ on the right side, and 2+ on the left side.   Pulmonary/Chest: Effort normal. No respiratory distress. She has decreased breath sounds in the right lower field and the left lower field.   Abdominal: Soft. Bowel sounds are normal. She exhibits no distension and no mass. There is no tenderness.   Musculoskeletal: Normal range of motion.   Lymphadenopathy:     She has no cervical adenopathy.        Right: No supraclavicular adenopathy present.        Left: No supraclavicular adenopathy present.   Neurological: She appears lethargic.   Skin: Skin is warm and dry. There is pallor.   Psychiatric: Cognition and memory are impaired.   Nursing note and vitals  reviewed.      Significant Labs:   CBC:   Recent Labs   Lab 04/14/20  0533   WBC 7.59   HGB 10.0*   HCT 31.0*       and CMP:   Recent Labs   Lab 04/14/20  0533 04/15/20  0427    142   K 3.4* 3.4*    109   CO2 24 23   * 174*   BUN 23 24*   CREATININE 1.7* 1.9*   CALCIUM 11.3* 10.7*   PROT 6.0 6.7   ALBUMIN 2.8* 2.7*   BILITOT 0.5 0.4   ALKPHOS 160* 175*   AST 58* 55*   ALT 39 32   ANIONGAP 9 10   EGFRNONAA 28* 25*       Diagnostic Results:  I have reviewed all pertinent imaging results/findings within the past 24 hours.    Assessment/Plan:     Hepatic lesions / masses   TMTC hypodense liver lesions on US, unable to complete CT due to poor kidney function. Plan is for biopsy per IR on Wednesday, Eliquis has been held to prepare for procedure.   --Due to comorbid conditions and overall status, patient would likely not be a candidate for treatment. In consideration of this, patient would be a good candidate for hospice. PM discussed goals of care with the family, family desires to have the biopsy completed and then discharge home on hospice.     --Continue supportive care  --Will review results when available    CKD (chronic kidney disease) stage 4, GFR 15-29 ml/min  Management per Nephrology    Hypercalcemia of malignancy  Hypercalcemia likely related to malignancy, PTHrp pending, PTH low. TMTC hypodense liver lesions seen on US, unable to complete CT scan due to poor kidney function. Plan is for liver biopsy per IR on Wednesday, Eliquis has been held to prepare for procedure. Depending on biopsy results will determine prognosis and treatment options. Treatment options will likely be limited due to patient overall condition or comorbid diagnoses. Calcitonin given on 4/11/2020, patient can discharge on intranasal calcitonin if needed.     --Will review pathology results when available and discuss results and prognosis with family at that time  --Continue supportive care  --Daily CBC,  CMP  --Zoledronic Acid ordered per Nephrology        Thank you for your consult. I will follow-up with patient. Please contact us if you have any additional questions.     Winnie Huitron NP  Hematology/Oncology  [facility]

## 2020-04-15 NOTE — CONSULTS
Consult completed on 4/14. See note from Deidre Jones, palliative RN.      Thank you for allowing us to be a part of the care of this patient.    Krishna Bennett, MSW, \[facility]\""W  Palliative Care  [phone]

## 2020-04-15 NOTE — ASSESSMENT & PLAN NOTE
Patient presented with constipation, abdominal pain, MS change (as per family) which are typical hypercalcemia symptoms.  No clear etiology. PTH appropriately suppressed. PTHrp are pending. Vitamin D level not elevated. SPEP did not show any paraproteins. S/p IV fluids and lasix. Calcium has decreased to 10.7 today (albumin 2.7). Patient received Zometa yesterday. Work-up revealed liver masses suggestive of liver cancer. Patient may suffer from hypercalcemia due to cancer-producing PTHrp.

## 2020-04-15 NOTE — NURSING
Patient discharged home. Left via wheelchair with granddaughter. IV discontinued per policy. Cardiac monitor removed. Follow up appointment made and prescriptions delivered to bedside. Discharge instructions reviewed and given to granddaughter. Surgical mask worn by patient for hospital guidelines upon discharge. No acute distress noted.

## 2020-04-15 NOTE — PLAN OF CARE
Fall prevention precautions maintained. Pt remained free of falls. Call light and personal items within easy reach. Pt remains NPO for liver biopsy. Pt remains confused and disoriented, oriented to person. Arguello patent and draining. No complaints of pain voiced. 24 hour chart check completed. Will continue to monitor until oncoming shift report is given.

## 2020-04-15 NOTE — PT/OT/SLP PROGRESS
Occupational Therapy      Patient Name:  Veronica Sue   MRN:  [id number]    Patient not seen today secondary to PT REFUSED AND DISCHARGED IN PROGRESS. Flory Lima, OT  4/15/2020   1600

## 2020-04-16 LAB
BACTERIA BLD CULT: NORMAL
PTH RELATED PROT SERPL-SCNC: 6.7 PMOL/L

## 2020-04-16 NOTE — PLAN OF CARE
04/16/20 1500   Final Note   Assessment Type Final Discharge Note   Right [facility] Referral Info   Post Acute Recommendation No Care

## 2020-04-17 LAB
BACTERIA BLD CULT: NORMAL
BACTERIA BLD CULT: NORMAL

## 2020-04-20 NOTE — DISCHARGE SUMMARY
[facility] Hospital Medicine  Discharge Summary      Patient Name: Veronica Sue  MRN: [id number]  Admission Date: 4/11/2020  Hospital Length of Stay: 4 days  Discharge Date and Time:  04/20/2020 12:15 PM  Attending Physician: No att. providers found   Discharging Provider: Nina Pink MD  Primary Care Provider: Jerry Amaro MD      HPI:   Brought in to the Emergency Department because of weakness. PMHx significant for A.fib, Cerebellar stroke, CAD, CKD III, DM 2, Diastolic HF/ HFpEF, DVT left peroneal vein.   Fell several times at home.  Weak and unable to keep balance.  Getting worse over the last week.  Also having general body aches and started nausea and vomiting tonight.  No problems urinating but is having bad constipation for some time.  No cough, shortness of breath, or chest pain.  Denies fevers or chills.  No known recent sick contacts.  No medicaiton changes. Family reports persistent nausea , decreased appetite and  significant weight loss nearly 15 to 20 pounds in the past month.      * No surgery found *      Hospital Course:   4/13- Pt remains very weak in general . She is awake , oriented to self. Speech is slow and does not make eye contact. Fine tremor is noted ria hands This morning per nursing staff , pt did not want to eat or drink. Speech therapy is consulted for swallow eval.  IR consult is placed for CT guided liver biopsy and will be performed on Wednesday . Eliquis is on hold in that regard . Hem/Onc also consulted . Labs resulted Hb 10.5, Pl clumped , creatinine down to 1.4, Calcium down to 10.9 , Intact PTH 5.9, AFP 15881, CA 19-9 67,   15.   4/14- Pt is seen at bedside , resting comfortable . She is awake , oriented to self , person and place today . States she feels little bit better. Serum calcium is rising . Discussed case with Nephro and approved Zoledronic acid 4 mg IV x 1 dose given serum creatinine 1.7 today . Plan for liver biopsy tomorrow  still in place . Off of eliquis   4/15- Pt underwent CT guided Liver biopsy by IR . There was no immediate complications. Per family wish decision was made to discharge pt home today post procedure with family care and set up Hospice care as outpatient when family is ready for the service . They wish to keep first Oncology visit in the clinic to discuss pathology report and next plan of action based on biopsy report.      Consults:   Consults (From admission, onward)        Status Ordering Provider     Inpatient consult to Hematology/Oncology  Once     Provider:  (Not yet assigned)    Completed SCAR JOHNSON     Inpatient consult to Interventional Radiology  Once     Provider:  (Not yet assigned)    Completed SCAR JOHNSON     Inpatient consult to Palliative Care  Once     Provider:  (Not yet assigned)    Completed JUSTA BADILLO     Inpatient consult to Social Work  Once     Provider:  (Not yet assigned)    Completed SCAR JOHNSON          No new Assessment & Plan notes have been filed under this hospital service since the last note was generated.  Service: Hospital Medicine    Final Active Diagnoses:    Diagnosis Date Noted POA    Hypercalcemia of malignancy [E83.52] 04/11/2020 Yes    Hepatic lesions / masses  [K76.9] 04/13/2020 Yes    CKD (chronic kidney disease) stage 4, GFR 15-29 ml/min [N18.4] 04/12/2020 Yes    Atrial fibrillation [I48.91] 04/12/2020 Yes     Chronic    Diabetes [E11.9] 04/12/2020 Yes    DVT, lower extremity [I82.409] 04/12/2020 Yes    Diastolic heart failure [I50.30] 04/11/2020 Yes      Problems Resolved During this Admission:    Diagnosis Date Noted Date Resolved POA    PRINCIPAL PROBLEM:  Encephalopathy, metabolic [G93.41] 04/11/2020 04/15/2020 Yes    Accelerated hypertension [I10] 04/11/2020 04/15/2020 Yes    Disorder of electrolytes [E87.8] 04/12/2020 04/15/2020 Yes    Acute cystitis without hematuria [N30.00] 04/11/2020 04/15/2020 Yes    LATRICE (acute kidney injury)  [N17.9] 04/15/2020 04/15/2020 Yes       Discharged Condition: stable    Disposition: Home or Self Care    Follow Up:  Follow-up Information     Jerry Amaro MD. Schedule an appointment as soon as possible for a visit in 3 days.    Specialty:  General Practice  Why:  Discharge follow up   Contact information:  [address]  [place] [address]  [phone]             Lew Fernández MD. Schedule an appointment as soon as possible for a visit in 1 week.    Specialty:  Hematology and Oncology  Why:  Follow up on Biopsy .   Contact information:  [address]  [place] [address]  [phone]                 Patient Instructions:      Diet diabetic   Order Comments: Mechanical soft, No Rice, No Bread, Chopped meat, Liquid Diet Level: Thin     Activity as tolerated       Significant Diagnostic Studies: Labs: BMP: No results for input(s): GLU, NA, K, CL, CO2, BUN, CREATININE, CALCIUM, MG in the last 48 hours., CMP No results for input(s): NA, K, CL, CO2, GLU, BUN, CREATININE, CALCIUM, PROT, ALBUMIN, BILITOT, ALKPHOS, AST, ALT, ANIONGAP, ESTGFRAFRICA, EGFRNONAA in the last 48 hours. and CBC No results for input(s): WBC, HGB, HCT, PLT in the last 48 hours.    Pending Diagnostic Studies:     Procedure Component Value Units Date/Time    Specimen to Pathology, Radiology Liver biopsy [[id number]] Collected:  04/15/20 1059    Order Status:  Sent Lab Status:  In process Updated:  04/16/20 0903         Medications:  Reconciled Home Medications:      Medication List      START taking these medications    bisacodyL 10 mg Supp  Commonly known as:  DULCOLAX  Place 1 suppository (10 mg total) rectally daily as needed.     calcitonin (salmon) 200 unit/actuation nasal spray  Commonly known as:  FORTICAL  1 spray by Nasal route once daily.     polyethylene glycol 17 gram/dose powder  Commonly known as:  GLYCOLAX  Take 17 g by mouth once daily.        CHANGE how you take these medications    doxazosin 4 MG  tablet  Commonly known as:  CARDURA  Take 1 tablet (4 mg total) by mouth once daily.  What changed:    · how much to take  · how to take this  · when to take this     ELIQUIS 2.5 mg Tab  Generic drug:  apixaban  Take 1 tablet (2.5 mg total) by mouth 2 (two) times daily. Start from 4/17/2020  What changed:  additional instructions     furosemide 40 MG tablet  Commonly known as:  LASIX  Take 1 tablet (40 mg total) by mouth once daily.  What changed:  when to take this     glimepiride 2 MG tablet  Commonly known as:  AMARYL  Take 1 tablet (2 mg total) by mouth daily with breakfast. TAKE WITH MEAL . HOLD IF ORAL INTAKE IS NOT ADEQUATE  What changed:    · when to take this  · additional instructions        CONTINUE taking these medications    ALPRAZolam 0.5 MG tablet  Commonly known as:  XANAX  Take 0.5 mg by mouth 2 (two) times daily.     amiodarone 200 MG Tab  Commonly known as:  PACERONE  Take 200 mg by mouth once daily.     benzonatate 100 MG capsule  Commonly known as:  TESSALON  TAKE 1 CAPSULE BY MOUTH EVERY 8 HOURS AS NEEDED FOR COUGH FOR 5 DAYS     hydrALAZINE 50 MG tablet  Commonly known as:  APRESOLINE  Take 50 mg by mouth 2 (two) times daily.     insulin  unit/mL injection  Inject 20 Units into the skin.     levothyroxine 88 MCG tablet  Commonly known as:  SYNTHROID  Take 88 mcg by mouth before breakfast.     metoprolol succinate 25 MG 24 hr tablet  Commonly known as:  TOPROL-XL  Take 25 mg by mouth once daily.     NIFEdipine 90 MG (OSM) 24 hr tablet  Commonly known as:  PROCARDIA-XL  Take 90 mg by mouth once daily.     pantoprazole 40 MG tablet  Commonly known as:  PROTONIX  Take 40 mg by mouth once daily.     potassium chloride SA 20 MEQ tablet  Commonly known as:  K-DUR,KLOR-CON  Take 20 mEq by mouth.     traMADoL 50 mg tablet  Commonly known as:  ULTRAM  TAKE 1 TABLET BY MOUTH ONCE A DAY AS NEEDED        STOP taking these medications    calcitRIOL 0.25 MCG Cap  Commonly known as:  ROCALTROL      HYDROcodone-acetaminophen 5-325 mg per tablet  Commonly known as:  NORCO     metOLazone 5 MG tablet  Commonly known as:  ZAROXOLYN     simvastatin 40 MG tablet  Commonly known as:  ZOCOR     tiZANidine 4 MG tablet  Commonly known as:  ZANAFLEX            Indwelling Lines/Drains at time of discharge:   Lines/Drains/Airways     None                 Time spent on the discharge of patient:  30  minutes  Patient was seen and examined on the date of discharge and determined to be suitable for discharge.         Nina Pink MD  Department of Hospital Medicine  [facility]

## 2020-04-22 LAB
COMMENT: ABNORMAL
FINAL PATHOLOGIC DIAGNOSIS: ABNORMAL
GROSS: ABNORMAL
Lab: ABNORMAL

## 2020-04-24 NOTE — PHYSICIAN QUERY
PT Name: Veronica Sue  MR #: [id number]    Physician Query Form - Pathology Findings Clarification     CDS/: Charlotte Velasquez               Contact information:[email or web address]  This form is a permanent document in the medical record.     Query Date: April 24, 2020      By submitting this query, we are merely seeking further clarification of documentation.  Please utilize your independent clinical judgment when addressing the question(s) below.      The medical record contains the following:     Findings Supporting Clinical Information Location in Medical Record    Metastatic well-differentiated neuroendocrine tumor, grade 3, suggestive of  gastrointestinal origin    Metastatic well-differentiated neuroendocrine tumor, grade 3, suggestive of gastrointestinal origin      Hypercalcemia of malignancy  Patient presented with constipation, abdominal pain, MS change (as per family) which are typical hypercalcemia symptoms.  No clear etiology. PTH appropriately suppressed. PTHrp are pending. Vitamin D level not elevated. SPEP did not show any paraproteins. S/p IV fluids and lasix. Calcium has decreased to 10.7 today (albumin 2.7). Patient received Zometa yesterday. Work-up revealed liver masses suggestive of liver cancer. Patient may suffer from hypercalcemia due to cancer-producing PTHrp.       first Oncology visit in the clinic to discuss pathology report and next plan of action based on biopsy report.       Pathology report           Nephrology note 4-15                                Discharge summary     Please document the clinical significance of the Pathologists findings of  Metastatic well-differentiated neuroendocrine tumor, grade 3, suggestive of gastrointestinal origin with primary site if known.    [  x ] I agree with the Pathology Findings,Please specify primary site if known - Gastrointestinal origin but specific site unknown without colonoscopy _______________   [   ] I do not agree  with the Pathology Findings   [   ] Other/Clarification of Findings:   [   ] Clinically Insignificant   [  ] Clinically Undetermined       Please document in your progress notes daily for the duration of treatment until resolved and include in your discharge summary.

## 2020-04-26 PROBLEM — D3A.8 NEUROENDOCRINE TUMOR: Status: ACTIVE | Noted: 2020-04-26

## 2020-04-27 ENCOUNTER — OFFICE VISIT (OUTPATIENT)
Dept: HEMATOLOGY/ONCOLOGY | Facility: CLINIC | Age: 80
End: 2020-04-27
Payer: MEDICARE

## 2020-04-27 DIAGNOSIS — D3A.8 NEUROENDOCRINE TUMOR: Primary | ICD-10-CM

## 2020-04-27 DIAGNOSIS — E83.52 HYPERCALCEMIA OF MALIGNANCY: ICD-10-CM

## 2020-04-27 PROCEDURE — 99215 OFFICE O/P EST HI 40 MIN: CPT | Mod: 95,,, | Performed by: INTERNAL MEDICINE

## 2020-04-27 PROCEDURE — 1159F MED LIST DOCD IN RCRD: CPT | Mod: 95,,, | Performed by: INTERNAL MEDICINE

## 2020-04-27 PROCEDURE — 99215 PR OFFICE/OUTPT VISIT, EST, LEVL V, 40-54 MIN: ICD-10-PCS | Mod: 95,,, | Performed by: INTERNAL MEDICINE

## 2020-04-27 PROCEDURE — 1159F PR MEDICATION LIST DOCUMENTED IN MEDICAL RECORD: ICD-10-PCS | Mod: 95,,, | Performed by: INTERNAL MEDICINE

## 2020-04-27 NOTE — PROGRESS NOTES
Subjective:       Patient ID: Veronica Sue is a 80 y.o. female.    Chief Complaint: Neuroendocrine tumor [D3A.8]  HPI: We have an opportunity to see Ms. Veronica Sue in Hematology Oncology clinic at [facility] on 04/27/2020.  Ms. Veronica Sue is a 80 y.o.woman with arthritis, atrial fibrillation, CHF, CKD 4, CAD, DM2, LE DVT, HTN presents to [facility] with weakness, nausea and vomiting, constipation.   Work-up revealed severe hypercalcemia and numerous hypodense liver lesions, largest measures 3.1 cm. 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  Hypercalcemia was treated with bisphosphonate, calcitonin. Has been on nasal calcitonin as outpatient.     No history exists.     Past Medical History:   Diagnosis Date    Anticoagulant long-term use     Anxiety     Arthritis     Atrial fibrillation     CHF (congestive heart failure)     CKD (chronic kidney disease) stage 4, GFR 15-29 ml/min     Coronary artery disease     Diabetes     Diastolic heart failure     DVT, lower extremity, left peroneal vein     GERD (gastroesophageal reflux disease)     Hypertension      Family History   Problem Relation Age of Onset    Heart disease Mother     Heart disease Father     Heart disease Sister     Stroke Sister     Heart disease Brother      Social History     Socioeconomic History    Marital status:      Spouse name: Not on file    Number of children: Not on file    Years of education: Not on file    Highest education level: Not on file   Occupational History    Not on file   Social Needs    Financial resource strain: Not on file    Food insecurity:     Worry: Not on file     Inability: Not on file    Transportation needs:     Medical: Not on file     Non-medical: Not on file   Tobacco Use    Smoking status: Never Smoker   Substance and Sexual Activity    Alcohol use: Not Currently    Drug use: Not on file    Sexual activity: Not on file   Lifestyle     Physical activity:     Days per week: Not on file     Minutes per session: Not on file    Stress: Not on file   Relationships    Social connections:     Talks on phone: Not on file     Gets together: Not on file     Attends Judaism service: Not on file     Active member of club or organization: Not on file     Attends meetings of clubs or organizations: Not on file     Relationship status: Not on file   Other Topics Concern    Not on file   Social History Narrative    Not on file     Past Surgical History:   Procedure Laterality Date    APPENDECTOMY      BREAST BIOPSY      CARDIAC CATHETERIZATION      HYSTERECTOMY      TONSILLECTOMY       Current Outpatient Medications   Medication Sig Dispense Refill    ALPRAZolam (XANAX) 0.5 MG tablet Take 0.5 mg by mouth 2 (two) times daily.      amiodarone (PACERONE) 200 MG Tab Take 200 mg by mouth once daily.      benzonatate (TESSALON) 100 MG capsule TAKE 1 CAPSULE BY MOUTH EVERY 8 HOURS AS NEEDED FOR COUGH FOR 5 DAYS      bisacodyL (DULCOLAX) 10 mg Supp Place 1 suppository (10 mg total) rectally daily as needed.  0    calcitonin, salmon, (FORTICAL) 200 unit/actuation nasal spray 1 spray by Nasal route once daily. 3.7 mL 0    doxazosin (CARDURA) 4 MG tablet Take 1 tablet (4 mg total) by mouth once daily. 30 tablet 0    ELIQUIS 2.5 mg Tab Take 1 tablet (2.5 mg total) by mouth 2 (two) times daily. Start from 4/17/2020 60 tablet 0    furosemide (LASIX) 40 MG tablet Take 1 tablet (40 mg total) by mouth once daily. 30 tablet 0    glimepiride (AMARYL) 2 MG tablet Take 1 tablet (2 mg total) by mouth daily with breakfast. TAKE WITH MEAL . HOLD IF ORAL INTAKE IS NOT ADEQUATE 60 tablet 0    hydrALAZINE (APRESOLINE) 50 MG tablet Take 50 mg by mouth 2 (two) times daily.      insulin  unit/mL injection Inject 20 Units into the skin.      levothyroxine (SYNTHROID) 88 MCG tablet Take 88 mcg by mouth before breakfast.       metoprolol succinate (TOPROL-XL) 25 MG  24 hr tablet Take 25 mg by mouth once daily.       NIFEdipine (PROCARDIA-XL) 90 MG (OSM) 24 hr tablet Take 90 mg by mouth once daily.       pantoprazole (PROTONIX) 40 MG tablet Take 40 mg by mouth once daily.      polyethylene glycol (GLYCOLAX) 17 gram/dose powder Take 17 g by mouth once daily. 238 g 0    potassium chloride SA (K-DUR,KLOR-CON) 20 MEQ tablet Take 20 mEq by mouth.      traMADoL (ULTRAM) 50 mg tablet TAKE 1 TABLET BY MOUTH ONCE A DAY AS NEEDED       No current facility-administered medications for this visit.        Labs:  Lab Results   Component Value Date    WBC 7.59 04/14/2020    HGB 10.0 (L) 04/14/2020    HCT 31.0 (L) 04/14/2020    MCV 91 04/14/2020     04/14/2020     BMP  Lab Results   Component Value Date     04/15/2020    K 3.4 (L) 04/15/2020     04/15/2020    CO2 23 04/15/2020    BUN 24 (H) 04/15/2020    CREATININE 1.9 (H) 04/15/2020    CALCIUM 10.7 (H) 04/15/2020    ANIONGAP 10 04/15/2020    ESTGFRAFRICA 28 (A) 04/15/2020    EGFRNONAA 25 (A) 04/15/2020     Lab Results   Component Value Date    ALT 32 04/15/2020    AST 55 (H) 04/15/2020     (H) 04/12/2020    ALKPHOS 175 (H) 04/15/2020    BILITOT 0.4 04/15/2020       Lab Results   Component Value Date    FERRITIN 68 04/11/2020     No results found for: IIQBLBSC18  No results found for: FOLATE  No results found for: TSH    I have reviewed the radiology reports and examined the scan/xray images.    Review of Systems   Constitutional: Positive for activity change, fatigue and unexpected weight change.   HENT: Negative.    Eyes: Negative.    Respiratory: Negative.    Cardiovascular: Negative.    Gastrointestinal: Negative.    Endocrine: Negative.    Genitourinary: Negative.    Musculoskeletal: Negative.    Skin: Negative.    Neurological: Negative.    Hematological: Negative.    Psychiatric/Behavioral: Negative.      ECOG SCORE    3 - Capable of only limited selfcare, confined to bed or chair more than 50% of waking  hours            Objective:   There were no vitals filed for this visit.There is no height or weight on file to calculate BMI.  Physical Exam      Assessment:      1. Neuroendocrine tumor    2. Hypercalcemia of malignancy           Plan:     Neuroendocrine tumor  I discussed Mrs. Sue diagnosis of metastatic well-differentiated NET grade 3 with her family, including her son, her daughter, and Ms. Lion her granddaughter on telemedicine. Per family, Mrs. Sue performance status is improved, she is able to get up on her own.  We discussed that she is not a candidate for chemotherapy due to comorbidity and performance status.  We discussed treating with lanreotide 120 mg SQ monthly.  We discussed that lanreotide is typically used in grade 1-2 NET, and it is unclear if would have efficacy in grade 3 NET.  However, it is likely that she would be able to tolerate. Possible side effect is nausea, hyperglycemia acutely.  Will give test dose octreotide 200 mcg, if tolerates, will plan for lanreotide next Monday May 4th.  Potential benefit of treatment is cancer control and improvement in hypercalcemia.  -     CBC auto differential; Future; Expected date: 04/27/2020  -     Comprehensive metabolic panel; Future; Expected date: 04/27/2020  -     CHROMOGRANIN A; Future; Expected date: 04/27/2020    Hypercalcemia of malignancy  Continue on nasal calcitonin.    Consult Start Time: 04/27/2020 16:00  Consult End Time: 04/27/2020 17:00        The patient location is: home  The chief complaint leading to consultation is: neuroendocrine tumor  Visit type: audiovisual  Total time spent with patient: 35 minutes  Each patient to whom he or she provides medical services by telemedicine is:  (1) informed of the relationship between the physician and patient and the respective role of any other health care provider with respect to management of the patient; and (2) notified that he or she may decline to receive medical services by  telemedicine and may withdraw from such care at any time.    Notes: as above

## 2020-04-27 NOTE — PLAN OF CARE
START OFF PATHWAY REGIMEN - Neuroendocrine            Lanreotide (Somatuline Depot Injection)     **Always confirm dose/schedule in your pharmacy ordering system**    Patient Characteristics:  GI Tract, Well-differentiated, High-grade, First Line  Tumor Location: GI Tract  Tumor Grade: High-grade  Line of therapy: First Line    Intent of Therapy:  Non-Curative / Palliative Intent, Discussed with Patient

## 2020-04-28 ENCOUNTER — TELEPHONE (OUTPATIENT)
Dept: HEMATOLOGY/ONCOLOGY | Facility: CLINIC | Age: 80
End: 2020-04-28

## 2020-04-28 ENCOUNTER — LAB VISIT (OUTPATIENT)
Dept: OTOLARYNGOLOGY | Facility: CLINIC | Age: 80
End: 2020-04-28
Payer: MEDICARE

## 2020-04-28 DIAGNOSIS — Z03.818 ENCOUNTER FOR OBSERVATION FOR SUSPECTED EXPOSURE TO OTHER BIOLOGICAL AGENTS RULED OUT: Primary | ICD-10-CM

## 2020-04-28 DIAGNOSIS — Z03.818 ENCOUNTER FOR OBSERVATION FOR SUSPECTED EXPOSURE TO OTHER BIOLOGICAL AGENTS RULED OUT: ICD-10-CM

## 2020-04-28 DIAGNOSIS — D3A.8 NEUROENDOCRINE TUMOR: ICD-10-CM

## 2020-04-28 LAB — SARS-COV-2 RNA RESP QL NAA+PROBE: NOT DETECTED

## 2020-04-28 PROCEDURE — U0002 COVID-19 LAB TEST NON-CDC: HCPCS

## 2020-04-28 NOTE — NURSING
Left voicemail for pt or family to call me back regarding the need to set up treatment for next week.  Direct contact number given for call back.

## 2020-04-28 NOTE — NURSING
Received call back from pts granddaughter Amisha regarding voicemail left.  Amisha stated that she is aware of the appointments for tomorrow but thought that we would see how she does and then schedule the full dose.  Notified Amisha that we could do it that way or I could schedule in advance and we could cancel if she does not want the appt.  Asked Amisha what day would be best for them to come in.  Amisha stated that she would need to talk to the family first to see who could bring her next week.  Notified Amisha that would be fine and asked for her to just give me a call back tomorrow and let me know.  Explained that I can then work on it around their best time and date.  Amisha stated that would be great and that she would call me back.

## 2020-04-29 ENCOUNTER — INFUSION (OUTPATIENT)
Dept: INFUSION THERAPY | Facility: HOSPITAL | Age: 80
End: 2020-04-29
Attending: INTERNAL MEDICINE
Payer: MEDICARE

## 2020-04-29 VITALS
RESPIRATION RATE: 18 BRPM | TEMPERATURE: 97 F | DIASTOLIC BLOOD PRESSURE: 65 MMHG | OXYGEN SATURATION: 98 % | HEART RATE: 63 BPM | SYSTOLIC BLOOD PRESSURE: 134 MMHG

## 2020-04-29 DIAGNOSIS — D3A.8 NEUROENDOCRINE TUMOR: Primary | ICD-10-CM

## 2020-04-29 PROCEDURE — 63600175 PHARM REV CODE 636 W HCPCS: Performed by: INTERNAL MEDICINE

## 2020-04-29 PROCEDURE — 96372 THER/PROPH/DIAG INJ SC/IM: CPT

## 2020-04-29 RX ORDER — OCTREOTIDE ACETATE 200 UG/ML
200 INJECTION INTRAVENOUS ONCE
Qty: 1 ML | Refills: 0
Start: 2020-04-29 | End: 2020-05-04

## 2020-04-29 RX ORDER — OCTREOTIDE ACETATE 500 UG/ML
200 INJECTION, SOLUTION INTRAVENOUS; SUBCUTANEOUS
Status: COMPLETED | OUTPATIENT
Start: 2020-04-29 | End: 2020-04-29

## 2020-04-29 RX ADMIN — OCTREOTIDE ACETATE 200 MCG: 500 INJECTION, SOLUTION INTRAVENOUS; SUBCUTANEOUS at 08:04

## 2020-04-29 NOTE — DISCHARGE INSTRUCTIONS
[facility]  [address] [facility]  [address]  [phone] phone     [phone] fax  Hours of Operation: Monday- Friday 8:00am- 5:00pm  After hours phone  [phone]  Hematology / Oncology Physicians on call      Dr. Jesus Arias, SHANT Benito NP Tyesha Taylor, NP    Please call with any concerns regarding your appointment today.    FALL PREVENTION   Falls often occur due to slipping, tripping or losing your balance. Here are ways to reduce your risk of falling again.   Was there anything that caused your fall that can be fixed, removed or replaced?   Make your home safe by keeping walkways clear of objects you may trip over.   Use non-slip pads under rugs.   Do not walk in poorly lit areas.   Do not stand on chairs or wobbly ladders.   Use caution when reaching overhead or looking upward. This position can cause a loss of balance.   Be sure your shoes fit properly, have non-slip bottoms and are in good condition.   Be cautious when going up and down stairs, curbs, and when walking on uneven sidewalks.   If your balance is poor, consider using a cane or walker.   If your fall was related to alcohol use, stop or limit alcohol intake.   If your fall was related to use of sleeping medicines, talk to your doctor about this. You may need to reduce your dosage at bedtime if you awaken during the night to go to the bathroom.   To reduce the need for nighttime bathroom trips:   Avoid drinking fluids for several hours before going to bed   Empty your bladder before going to bed   Men can keep a urinal at the bedside   © 1172-8375 [employer] [facility], [address] [place] [address], [place] [address]. All rights reserved. This information is not intended as a substitute for professional medical care. Always follow your healthcare professional's instructions.

## 2020-04-29 NOTE — NURSING
Injection given without difficulties. Bandaid applied. Patient instructed to stay in the clinic for 30 minutes. Patient verbalized understanding and will notify nurse with any complaints.

## 2020-04-30 ENCOUNTER — TELEPHONE (OUTPATIENT)
Dept: HEMATOLOGY/ONCOLOGY | Facility: CLINIC | Age: 80
End: 2020-04-30

## 2020-04-30 DIAGNOSIS — D3A.8 NEUROENDOCRINE TUMOR: Primary | ICD-10-CM

## 2020-04-30 NOTE — NURSING
Contacted pt back regarding the appointments being scheduled.  Phone number for pt is actually the pts granddaughter Amisha's phone number.  Left voicemail notifying that all appointments for been scheduled for Monday starting with lab work at 1PM.  Explained that if they look on the computer it will say that labs are around 3PM but that we really would like her there for 1PM with Dr. Fernández appointment following then full dose shot.  Notified Amisha to call me back directly with any questions or concerns that they may have.    Oncology Navigation   Intake  Date of Diagnosis: 04/15/20  Cancer Type: Neuroendocrine  MD Assigned: Lew Fernández MD  Contact Method: Individual basket  Date Worked: 20     Treatment  Current Status: Active  Treatment Type(s): Other (Comment)  Other Treatment: lanreotide    Other Systemic Treatment: lanreotide    Procedures: Other  Other Schedule Date: 20 (covid screening)           Acuity  Systemic Treatment - predicted or initiated: Other (+1)  Treatment Tolerability: Has not started treatment yet/treatment fully completed and side effects resolved  ECO  Comorbidities in Medical History: 1  Hospitalization Within the Past Month: 1   Needed: 0  Support: 0  Verbalizes Financial Concerns: 0  Transportation: 0  History of noncompliance/frequent no shows and cancellations: 0  Verbalizes the need for more education: 0  Navigation Acuity: 5     Follow Up  Follow up in about 4 weeks (around 2020) for follow up.

## 2020-04-30 NOTE — NURSING
Phone call received from pts granddaughter, Amisha, stating that pt tolerated her lanreotide test dose.  She stated that the pt has not had any nausea and that she would like to get the full dose.  Asked what day that they would like to come in next week and she stated Monday.  Notified Amisha that I would get her scheduled and call her back with the details.  Explained that if she does not answer that I will leave on her voicemail.  Amisha stated that would be great.  Explained for them to call me back with any questions or concerns.

## 2020-05-03 NOTE — PROGRESS NOTES
Subjective:       Patient ID: Veronica Sue is a 80 y.o. female.    Chief Complaint: Neuroendocrine tumor [D3A.8]  HPI: We have an opportunity to see Ms. Veronica Sue in Hematology Oncology clinic at [facility] on 05/03/2020.  Ms. Veronica Sue is a 80 y.o. woman with arthritis, atrial fibrillation, CHF, CKD 4, CAD, DM2, LE DVT, HTN [place] [facility], nausea and vomiting, constipation.   Work-up revealed severe hypercalcemia and numerous hypodense liver lesions, largest measures 3.1 cm. 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  Hypercalcemia was treated with bisphosphonate, calcitonin. Has been on nasal calcitonin as outpatient.  Had octreotide test dose, tolerated.        No history exists.     Past Medical History:   Diagnosis Date    Anticoagulant long-term use     Anxiety     Arthritis     Atrial fibrillation     CHF (congestive heart failure)     CKD (chronic kidney disease) stage 4, GFR 15-29 ml/min     Coronary artery disease     Diabetes     Diastolic heart failure     DVT, lower extremity, left peroneal vein     GERD (gastroesophageal reflux disease)     Hypertension      Family History   Problem Relation Age of Onset    Heart disease Mother     Heart disease Father     Heart disease Sister     Stroke Sister     Heart disease Brother      Social History     Socioeconomic History    Marital status:      Spouse name: Not on file    Number of children: Not on file    Years of education: Not on file    Highest education level: Not on file   Occupational History    Not on file   Social Needs    Financial resource strain: Not on file    Food insecurity:     Worry: Not on file     Inability: Not on file    Transportation needs:     Medical: Not on file     Non-medical: Not on file   Tobacco Use    Smoking status: Never Smoker   Substance and Sexual Activity    Alcohol use: Not Currently    Drug use: Not on file     Sexual activity: Not on file   Lifestyle    Physical activity:     Days per week: Not on file     Minutes per session: Not on file    Stress: Not on file   Relationships    Social connections:     Talks on phone: Not on file     Gets together: Not on file     Attends Orthodoxy service: Not on file     Active member of club or organization: Not on file     Attends meetings of clubs or organizations: Not on file     Relationship status: Not on file   Other Topics Concern    Not on file   Social History Narrative    Not on file     Past Surgical History:   Procedure Laterality Date    APPENDECTOMY      BREAST BIOPSY      CARDIAC CATHETERIZATION      HYSTERECTOMY      TONSILLECTOMY       Current Outpatient Medications   Medication Sig Dispense Refill    ALPRAZolam (XANAX) 0.5 MG tablet Take 0.5 mg by mouth 2 (two) times daily.      amiodarone (PACERONE) 200 MG Tab Take 200 mg by mouth once daily.      benzonatate (TESSALON) 100 MG capsule TAKE 1 CAPSULE BY MOUTH EVERY 8 HOURS AS NEEDED FOR COUGH FOR 5 DAYS      bisacodyL (DULCOLAX) 10 mg Supp Place 1 suppository (10 mg total) rectally daily as needed.  0    calcitonin, salmon, (FORTICAL) 200 unit/actuation nasal spray 1 spray by Nasal route once daily. 3.7 mL 0    doxazosin (CARDURA) 4 MG tablet Take 1 tablet (4 mg total) by mouth once daily. 30 tablet 0    ELIQUIS 2.5 mg Tab Take 1 tablet (2.5 mg total) by mouth 2 (two) times daily. Start from 4/17/2020 60 tablet 0    furosemide (LASIX) 40 MG tablet Take 1 tablet (40 mg total) by mouth once daily. 30 tablet 0    glimepiride (AMARYL) 2 MG tablet Take 1 tablet (2 mg total) by mouth daily with breakfast. TAKE WITH MEAL . HOLD IF ORAL INTAKE IS NOT ADEQUATE 60 tablet 0    hydrALAZINE (APRESOLINE) 50 MG tablet Take 50 mg by mouth 2 (two) times daily.      insulin  unit/mL injection Inject 20 Units into the skin.      levothyroxine (SYNTHROID) 88 MCG tablet Take 88 mcg by mouth before breakfast.        metoprolol succinate (TOPROL-XL) 25 MG 24 hr tablet Take 25 mg by mouth once daily.       NIFEdipine (PROCARDIA-XL) 90 MG (OSM) 24 hr tablet Take 90 mg by mouth once daily.       octreotide (SANDOSTATIN) 200 mcg/mL Soln Inject 1 mL (200 mcg total) into the skin once. for 1 dose 1 mL 0    pantoprazole (PROTONIX) 40 MG tablet Take 40 mg by mouth once daily.      polyethylene glycol (GLYCOLAX) 17 gram/dose powder Take 17 g by mouth once daily. 238 g 0    potassium chloride SA (K-DUR,KLOR-CON) 20 MEQ tablet Take 20 mEq by mouth.      traMADoL (ULTRAM) 50 mg tablet TAKE 1 TABLET BY MOUTH ONCE A DAY AS NEEDED       No current facility-administered medications for this visit.        Labs:  Lab Results   Component Value Date    WBC 5.31 04/29/2020    HGB 10.5 (L) 04/29/2020    HCT 33.9 (L) 04/29/2020    MCV 90 04/29/2020     04/29/2020     BMP  Lab Results   Component Value Date     04/29/2020    K 3.7 04/29/2020     04/29/2020    CO2 21 (L) 04/29/2020    BUN 15 04/29/2020    CREATININE 1.8 (H) 04/29/2020    CALCIUM 9.3 04/29/2020    ANIONGAP 11 04/29/2020    ESTGFRAFRICA 30 (A) 04/29/2020    EGFRNONAA 26 (A) 04/29/2020     Lab Results   Component Value Date    ALT 43 04/29/2020    AST 98 (H) 04/29/2020     (H) 04/12/2020    ALKPHOS 275 (H) 04/29/2020    BILITOT 0.7 04/29/2020       Lab Results   Component Value Date    FERRITIN 68 04/11/2020     No results found for: [id number]  No results found for: FOLATE  No results found for: TSH    I have reviewed the radiology reports and examined the scan/xray images.    Review of Systems   Constitutional: Negative.    HENT: Negative.    Eyes: Negative.    Respiratory: Negative.    Cardiovascular: Negative.    Gastrointestinal: Negative.    Endocrine: Negative.    Genitourinary: Negative.    Musculoskeletal: Negative.    Skin: Negative.    Allergic/Immunologic: Negative.    Neurological: Negative.    Hematological: Negative.     Psychiatric/Behavioral: Negative.      ECOG SCORE    3 - Capable of only limited selfcare, confined to bed or chair more than 50% of waking hours            Objective:   There were no vitals filed for this visit.There is no height or weight on file to calculate BMI.  Physical Exam      Assessment:      1. Neuroendocrine tumor    2. Deep vein thrombosis (DVT) of distal vein of lower extremity, unspecified chronicity, unspecified laterality           Plan:     Neuroendocrine tumor  Will start lanreotide 120 mg today and then every 4 weeks.  We discussed the benefit and risk of treatment.  Goal of treatment is for improve survival time and improve quality of life. Risk of treatment includes but is not limited to nausea, pain at injection site, hypo and hyperglycemia.    -     promethazine (PHENERGAN) 12.5 MG Tab; Take 1 tablet (12.5 mg total) by mouth every 6 (six) hours as needed.  Dispense: 60 tablet; Refill: 1  -     X-Ray Pelvis Complete min 3 views; Future; Expected date: 05/04/2020  -     CBC auto differential; Standing  -     Comprehensive metabolic panel; Standing  -     ondansetron (ZOFRAN ODT) 4 MG TbDL; Take 1 tablet (4 mg total) by mouth every 6 (six) hours as needed.  Dispense: 60 tablet; Refill: 0    Deep vein thrombosis (DVT) of distal vein of lower extremity, unspecified chronicity, unspecified laterality    Hypercalcemia of malignancy  -     calcitonin, salmon, (FORTICAL) 200 unit/actuation nasal spray; 1 spray by Nasal route once daily.  Dispense: 3.7 mL; Refill: 0

## 2020-05-04 ENCOUNTER — HOSPITAL ENCOUNTER (OUTPATIENT)
Dept: RADIOLOGY | Facility: HOSPITAL | Age: 80
Discharge: HOME | End: 2020-05-04
Attending: INTERNAL MEDICINE
Payer: MEDICARE

## 2020-05-04 ENCOUNTER — INFUSION (OUTPATIENT)
Dept: INFUSION THERAPY | Facility: HOSPITAL | Age: 80
End: 2020-05-04
Attending: INTERNAL MEDICINE
Payer: MEDICARE

## 2020-05-04 ENCOUNTER — OFFICE VISIT (OUTPATIENT)
Dept: HEMATOLOGY/ONCOLOGY | Facility: CLINIC | Age: 80
End: 2020-05-04
Payer: MEDICARE

## 2020-05-04 VITALS
DIASTOLIC BLOOD PRESSURE: 62 MMHG | HEART RATE: 61 BPM | SYSTOLIC BLOOD PRESSURE: 138 MMHG | WEIGHT: 182.75 LBS | TEMPERATURE: 97 F | OXYGEN SATURATION: 98 % | BODY MASS INDEX: 30.45 KG/M2 | HEIGHT: 65 IN

## 2020-05-04 DIAGNOSIS — E83.52 HYPERCALCEMIA OF MALIGNANCY: ICD-10-CM

## 2020-05-04 DIAGNOSIS — D3A.8 NEUROENDOCRINE TUMOR: ICD-10-CM

## 2020-05-04 DIAGNOSIS — D3A.8 NEUROENDOCRINE TUMOR: Primary | ICD-10-CM

## 2020-05-04 DIAGNOSIS — I82.4Z9 DEEP VEIN THROMBOSIS (DVT) OF DISTAL VEIN OF LOWER EXTREMITY, UNSPECIFIED CHRONICITY, UNSPECIFIED LATERALITY: ICD-10-CM

## 2020-05-04 DIAGNOSIS — Z03.818 ENCOUNTER FOR OBSERVATION FOR SUSPECTED EXPOSURE TO OTHER BIOLOGICAL AGENTS RULED OUT: Primary | ICD-10-CM

## 2020-05-04 PROCEDURE — 72170 XR PELVIS ROUTINE AP: ICD-10-PCS | Mod: 26,,, | Performed by: RADIOLOGY

## 2020-05-04 PROCEDURE — 1101F PT FALLS ASSESS-DOCD LE1/YR: CPT | Mod: CPTII,S$GLB,, | Performed by: INTERNAL MEDICINE

## 2020-05-04 PROCEDURE — 3078F PR MOST RECENT DIASTOLIC BLOOD PRESSURE < 80 MM HG: ICD-10-PCS | Mod: CPTII,S$GLB,, | Performed by: INTERNAL MEDICINE

## 2020-05-04 PROCEDURE — 1101F PR PT FALLS ASSESS DOC 0-1 FALLS W/OUT INJ PAST YR: ICD-10-PCS | Mod: CPTII,S$GLB,, | Performed by: INTERNAL MEDICINE

## 2020-05-04 PROCEDURE — 3078F DIAST BP <80 MM HG: CPT | Mod: CPTII,S$GLB,, | Performed by: INTERNAL MEDICINE

## 2020-05-04 PROCEDURE — 72170 X-RAY EXAM OF PELVIS: CPT | Mod: TC

## 2020-05-04 PROCEDURE — 3075F PR MOST RECENT SYSTOLIC BLOOD PRESS GE 130-139MM HG: ICD-10-PCS | Mod: CPTII,S$GLB,, | Performed by: INTERNAL MEDICINE

## 2020-05-04 PROCEDURE — 99215 PR OFFICE/OUTPT VISIT, EST, LEVL V, 40-54 MIN: ICD-10-PCS | Mod: 25,S$GLB,, | Performed by: INTERNAL MEDICINE

## 2020-05-04 PROCEDURE — 1159F MED LIST DOCD IN RCRD: CPT | Mod: S$GLB,,, | Performed by: INTERNAL MEDICINE

## 2020-05-04 PROCEDURE — 72170 X-RAY EXAM OF PELVIS: CPT | Mod: 26,,, | Performed by: RADIOLOGY

## 2020-05-04 PROCEDURE — 99999 PR PBB SHADOW E&M-EST. PATIENT-LVL IV: ICD-10-PCS | Mod: PBBFAC,,, | Performed by: INTERNAL MEDICINE

## 2020-05-04 PROCEDURE — 1125F AMNT PAIN NOTED PAIN PRSNT: CPT | Mod: S$GLB,,, | Performed by: INTERNAL MEDICINE

## 2020-05-04 PROCEDURE — 63600175 PHARM REV CODE 636 W HCPCS: Mod: JG | Performed by: INTERNAL MEDICINE

## 2020-05-04 PROCEDURE — 1159F PR MEDICATION LIST DOCUMENTED IN MEDICAL RECORD: ICD-10-PCS | Mod: S$GLB,,, | Performed by: INTERNAL MEDICINE

## 2020-05-04 PROCEDURE — 99999 PR PBB SHADOW E&M-EST. PATIENT-LVL IV: CPT | Mod: PBBFAC,,, | Performed by: INTERNAL MEDICINE

## 2020-05-04 PROCEDURE — 99215 OFFICE O/P EST HI 40 MIN: CPT | Mod: 25,S$GLB,, | Performed by: INTERNAL MEDICINE

## 2020-05-04 PROCEDURE — 3075F SYST BP GE 130 - 139MM HG: CPT | Mod: CPTII,S$GLB,, | Performed by: INTERNAL MEDICINE

## 2020-05-04 PROCEDURE — 96372 THER/PROPH/DIAG INJ SC/IM: CPT

## 2020-05-04 PROCEDURE — 1125F PR PAIN SEVERITY QUANTIFIED, PAIN PRESENT: ICD-10-PCS | Mod: S$GLB,,, | Performed by: INTERNAL MEDICINE

## 2020-05-04 RX ORDER — ONDANSETRON 4 MG/1
4 TABLET, ORALLY DISINTEGRATING ORAL EVERY 6 HOURS PRN
Qty: 60 TABLET | Refills: 0 | Status: SHIPPED | OUTPATIENT
Start: 2020-05-04

## 2020-05-04 RX ORDER — CALCITONIN SALMON 200 [IU]/.09ML
1 SPRAY, METERED NASAL DAILY
Qty: 3.7 ML | Refills: 0 | Status: SHIPPED | OUTPATIENT
Start: 2020-05-04 | End: 2020-06-03

## 2020-05-04 RX ORDER — PROMETHAZINE HYDROCHLORIDE 12.5 MG/1
12.5 TABLET ORAL EVERY 6 HOURS PRN
Qty: 60 TABLET | Refills: 1 | Status: SHIPPED | OUTPATIENT
Start: 2020-05-04

## 2020-05-04 RX ORDER — ONDANSETRON 4 MG/1
4 TABLET, ORALLY DISINTEGRATING ORAL EVERY 6 HOURS PRN
Qty: 1 TABLET | Refills: 0 | Status: SHIPPED | OUTPATIENT
Start: 2020-05-04 | End: 2020-05-04 | Stop reason: SDUPTHER

## 2020-05-04 RX ORDER — LANREOTIDE ACETATE 120 MG/.5ML
120 INJECTION SUBCUTANEOUS
Status: COMPLETED | OUTPATIENT
Start: 2020-05-04 | End: 2020-05-04

## 2020-05-04 RX ADMIN — LANREOTIDE ACETATE 120 MG: 120 INJECTION SUBCUTANEOUS at 02:05

## 2020-05-07 ENCOUNTER — TELEPHONE (OUTPATIENT)
Dept: HEMATOLOGY/ONCOLOGY | Facility: CLINIC | Age: 80
End: 2020-05-07

## 2020-05-07 NOTE — TELEPHONE ENCOUNTER
----- Message from Lew Fernández MD sent at 5/7/2020  4:53 PM CDT -----  Contact: Pt's son-Rajiv Shankar,    Will need blood draw with CBC comp please    ----- Message -----  From: Celeste Underwood LPN  Sent: 5/7/2020   4:36 PM CDT  To: Lew Fernández MD        ----- Message -----  From: Melani Gibson  Sent: 5/7/2020   3:58 PM CDT  To: [facility] Staff    Type:  Needs Medical Advice    Who Called: Pt's son-Rajiv Sue   Symptoms (please be specific): weakness & fatigue   How long has patient had these symptoms: few months   Pharmacy name and phone #:  N/a   Would the patient rather a call back or a response via [facility]? Call back   Best Call Back Number: [phone]  Additional Information: n/a

## 2020-05-08 ENCOUNTER — TELEPHONE (OUTPATIENT)
Dept: HEMATOLOGY/ONCOLOGY | Facility: CLINIC | Age: 80
End: 2020-05-08

## 2020-05-08 NOTE — TELEPHONE ENCOUNTER
----- Message from Candy Tenorio sent at 5/8/2020 11:17 AM CDT -----  Type:  Needs Medical Advice    Who Called:  Pt  Son (Rajiv)  Symptoms (please be specific):   States your office called him yesterday and the Dr is wanting to do some tests//was told he would send home health to her home to draw the blood//they have not heard anything this morning   How long has patient had these symptoms:     Pharmacy name and phone #:     Would the patient rather a call back or a response via [facility]?    Call back  Best Call Back Number:   [phone]  Additional Information:  Please call//thanks//deena

## 2020-05-08 NOTE — TELEPHONE ENCOUNTER
Spoke with [facility] on 5/8/2020. We were informed that they will be contacting the family today to schedule admission for tomorrow.

## 2020-05-09 PROCEDURE — G0180 PR HOME HEALTH MD CERTIFICATION: ICD-10-PCS | Mod: ,,, | Performed by: INTERNAL MEDICINE

## 2020-05-09 PROCEDURE — G0180 MD CERTIFICATION HHA PATIENT: HCPCS | Mod: ,,, | Performed by: INTERNAL MEDICINE

## 2020-05-13 ENCOUNTER — DOCUMENTATION ONLY (OUTPATIENT)
Dept: HEMATOLOGY/ONCOLOGY | Facility: CLINIC | Age: 80
End: 2020-05-13

## 2020-05-13 NOTE — PROGRESS NOTES
Patient was referred to Social work due to having a distress score of 7. Sw called and left a VM on patient's phone.  called patient's son Mr. Sue and introduced herself. Mr. uSe reports his mom is receiving services from home health. He reports she has not had energy to move around the home. Mr. Sue attributes it to patient not eating. Sw asked about meal replacement shakes. He reports she drinks them sometimes but not a lot. He states it has been hard on the family to watch patient decline. Sw offered emotional support to Mr. Seu and explained hearing patient wishes during this time. He reports patient has started to open up about plans before she pass away. Mr. Sue reports he is at the doctor's office and asked could  text him the office number so he can call in the future. Cass texted Mr. Sue the office number to  direct line. Cass will continue to follow up.

## 2020-05-18 ENCOUNTER — TELEPHONE (OUTPATIENT)
Dept: HEMATOLOGY/ONCOLOGY | Facility: CLINIC | Age: 80
End: 2020-05-18

## 2020-05-18 NOTE — TELEPHONE ENCOUNTER
----- Message from Lew Fernández MD sent at 5/16/2020  8:08 AM CDT -----  Contact: [facility]  May we ask to go to ER    ----- Message -----  From: Vidya Oneal MA  Sent: 5/15/2020   2:11 PM CDT  To: Lew Fernández MD    Spoke with Home Health Nurse and states that she just left the pt and the pt is severly weak, has no energy to get out bed, and has not had BM since Monday. Nurse states she was given a suppository at 10am this morning and it has not worked yet. Home Health want to know if fluids needs to be given or what will be the next best option. Pt's son also requested a Hospital Bed.   ----- Message -----  From: Skylar Marmolejo  Sent: 5/15/2020   1:50 PM CDT  To: [facility] Staff    Please call nurse [phone] regarding pt, states she called earlier about pt, states pt is weak, no bowel movement since Monday, gave suppository today, no results.

## 2020-05-18 NOTE — TELEPHONE ENCOUNTER
Returning call back to Home Health Nurse regarding pts' symptoms. Dr. Fernández requested pt go to the ER.

## 2020-05-22 ENCOUNTER — DOCUMENT SCAN (OUTPATIENT)
Dept: HOME HEALTH SERVICES | Facility: HOSPITAL | Age: 80
End: 2020-05-22
Payer: MEDICARE

## 2020-05-25 ENCOUNTER — DOCUMENT SCAN (OUTPATIENT)
Dept: HOME HEALTH SERVICES | Facility: HOSPITAL | Age: 80
End: 2020-05-25
Payer: MEDICARE

## 2020-05-25 ENCOUNTER — EXTERNAL HOME HEALTH (OUTPATIENT)
Dept: HOME HEALTH SERVICES | Facility: HOSPITAL | Age: 80
End: 2020-05-25
Payer: MEDICARE

## 2020-06-01 ENCOUNTER — DOCUMENTATION ONLY (OUTPATIENT)
Dept: HEMATOLOGY/ONCOLOGY | Facility: CLINIC | Age: 80
End: 2020-06-01

## 2020-06-01 NOTE — PROGRESS NOTES
Nurse called pt's home number regarding a scheduled apt today with dr edwards, her son informed dr. Edwards's staff, ms [address]izabeth [address] at home on yesterday 5-

## 2021-12-20 NOTE — SUBJECTIVE & OBJECTIVE
Interval History: Patient is more confused today and is drowsy. Will evaluate an ammonia level. Plan is for a liver biopsy per IR, due to patient comorbid conditions and current status, treatment would not be possible. Patient is a candidate for hospice and avoiding invasive procedures may be in patient best interest. Consulted Palliative Medicine team to discuss goals of care and offer hospice.     Oncology Treatment Plan:   [No treatment plan]    Medications:  Continuous Infusions:   sodium chloride 0.9% 100 mL/hr at 04/14/20 0913     Scheduled Meds:   ALPRAZolam  0.5 mg Oral BID    amiodarone  200 mg Oral Daily    cefTRIAXone (ROCEPHIN) IVPB  1 g Intravenous Q24H    doxazosin  4 mg Oral Daily    hydrALAZINE  50 mg Oral BID    levothyroxine  88 mcg Oral Before breakfast    metoprolol succinate  25 mg Oral Daily    NIFEdipine  90 mg Oral Daily    pantoprazole  40 mg Oral Daily    polyethylene glycol  17 g Oral Daily    simvastatin  40 mg Oral QHS     PRN Meds:bisacodyL, dextrose 10 % in water (D10W), dextrose 10 % in water (D10W), glucagon (human recombinant), glucose, glucose, hydrALAZINE, insulin aspart U-100, ondansetron, ondansetron, sodium chloride 0.9%, Pharmacy to dose Vancomycin consult **AND** vancomycin - pharmacy to dose     Review of Systems   Unable to perform ROS: Mental status change     Objective:     Vital Signs (Most Recent):  Temp: 98.8 °F (37.1 °C) (04/14/20 0701)  Pulse: 64 (04/14/20 0759)  Resp: 19 (04/14/20 0701)  BP: (!) 148/63 (04/14/20 0701)  SpO2: 96 % (04/14/20 0759) Vital Signs (24h Range):  Temp:  [97.2 °F (36.2 °C)-99 °F (37.2 °C)] 98.8 °F (37.1 °C)  Pulse:  [63-85] 64  Resp:  [16-19] 19  SpO2:  [94 %-98 %] 96 %  BP: (126-178)/() 148/63     Weight: 85.5 kg (188 lb 7.9 oz)  Body mass index is 31.37 kg/m².  Body surface area is 1.98 meters squared.      Intake/Output Summary (Last 24 hours) at 4/14/2020 1142  Last data filed at 4/14/2020 0402  Gross per 24 hour    Intake 537.5 ml   Output 701 ml   Net -163.5 ml       Physical Exam   Constitutional: She appears lethargic. She appears cachectic. She has a sickly appearance. She appears ill. No distress.   HENT:   Head: Normocephalic and atraumatic.   Right Ear: External ear normal.   Left Ear: External ear normal.   Nose: No rhinorrhea or sinus tenderness. Right sinus exhibits no maxillary sinus tenderness and no frontal sinus tenderness. Left sinus exhibits no maxillary sinus tenderness and no frontal sinus tenderness.   Mouth/Throat: Uvula is midline, oropharynx is clear and moist and mucous membranes are normal. No oral lesions.   Eyes: Pupils are equal, round, and reactive to light. Conjunctivae are normal. Right eye exhibits no discharge. Left eye exhibits no discharge.   Cardiovascular: Normal rate, regular rhythm, S1 normal, S2 normal, normal heart sounds and intact distal pulses.   No murmur heard.  Pulses:       Dorsalis pedis pulses are 2+ on the right side, and 2+ on the left side.   Pulmonary/Chest: Effort normal. No respiratory distress. She has decreased breath sounds in the right lower field and the left lower field.   Abdominal: Soft. Bowel sounds are normal. She exhibits no distension and no mass. There is no tenderness.   Musculoskeletal: Normal range of motion.   Lymphadenopathy:     She has no cervical adenopathy.        Right: No supraclavicular adenopathy present.        Left: No supraclavicular adenopathy present.   Neurological: She appears lethargic.   Skin: Skin is warm and dry. Capillary refill takes less than 2 seconds. No rash noted.   Psychiatric: Cognition and memory are impaired.   Vitals reviewed.      Significant Labs:   CBC:   Recent Labs   Lab 04/13/20  0515 04/14/20  0533   WBC 6.58 7.59   HGB 10.5* 10.0*   HCT 33.5* 31.0*   PLT 87* 208    and CMP:   Recent Labs   Lab 04/13/20 0515 04/14/20  0533    141   K 3.8 3.4*    108   CO2 20* 24   * 218*   BUN 18 23   CREATININE  1.4 1.7*   CALCIUM 10.9* 11.3*   PROT 6.4 6.0   ALBUMIN 3.1* 2.8*   BILITOT 0.5 0.5   ALKPHOS 184* 160*   AST 68* 58*   ALT 50* 39   ANIONGAP 12 9   EGFRNONAA 36* 28*       Diagnostic Results:  I have reviewed all pertinent imaging results/findings within the past 24 hours.   No

## 2022-05-06 NOTE — DISCHARGE INSTRUCTIONS
5/6/2022      To Whom it May Concern:      Soha Vazquez, 1970, has been under my medical care since 3/8/22 but has seen my partners for many years prior to that. She has had several months of significant medical issues including IBS, depression and symptoms of long COVID. She has had extensive medical workup and is planning to take a 6-month ADA leave of absence from her job to focus on improving her health related to the above diagnoses. I support her decision to pursue this leave of absence.       If you have any further questions please call me at [phone]    Sincerely,      Oksana Holt PA-C  [facility]  [address] [place] [address]  phone [phone]  fax [phone]         Discharge Instructions for Liver Biopsy  You had a procedure called liver biopsy. A healthcare provider used a special needle to remove a small piece of tissue from your liver. Then it was examined for signs of damage or disease. A liver biopsy is ordered after other tests have shown that your liver is not working properly. You may also have a liver biopsy when liver disease is suspected, to determine whether there is too much iron in the liver, or to rule out cancer.  Home care  Recommendations include the following:   · Because you had anesthesia, you should not drive until the day after your biopsy.   · Remove the bandage covering the biopsy site 48 hours after the procedure.  · Rest for 6 hours and take it easy when you arrive home.  · Dont shower for 24 hours after the biopsy. If you wish, you may wash yourself with a sponge or washcloth. When you are able to shower, dont scrub the site. Gently wash the area and pat it dry.  · Dont lift anything heavier than 10 pounds for up to 1 week after the procedure, or as advised by your healthcare provider.  · Don't do strenuous activities or exercises for up to 1 week after the procedure.  · Ask your healthcare provider when you can return to work.  · Do not start taking blood thinners without clear instructions from your healthcare provider.  Follow-up care  Make a follow-up appointment as directed by our staff.     When to call your healthcare provider  Call your healthcare provider immediately if you have any of the following:  · Bleeding from the biopsy site  · Dizziness or lightheadedness  · Sudden or increased shortness of breath  · Sudden chest pain  · Fever of 100.4°F (38.0°C) or higher, or as directed by your healthcare provider  · Shaking chills  · Yellow eyes or skin  · Increasing redness, tenderness, or swelling at the biopsy site  · Drainage from the biopsy site  · Opening of the biopsy site  · Vomiting blood  · Rectal bleeding or bloody  stools  · Increasing pain, with or without activity, in the liver or belly area, or pain shooting to the right shoulder   Date Last Reviewed: 7/1/2016 © 2000-2017 [employer]. [address] [place] [address]. All rights reserved. This information is not intended as a substitute for professional medical care. Always follow your healthcare professional's instructions.        Recovery After Procedural Sedation (Adult)  You have been given medicine by vein to make you sleep during your surgery. This may have included both a pain medicine and sleeping medicine. Most of the effects have worn off. But you may still have some drowsiness for the next 6 to 8 hours.  Home care  Follow these guidelines when you get home:  · For the next 8 hours, you should be watched by a responsible adult. This person should make sure your condition is not getting worse.  · Don't drink any alcohol for the next 24 hours.  · Don't drive, operate dangerous machinery, or make important business or personal decisions during the next 24 hours.  Note: Your healthcare provider may tell you not to take any medicine by mouth for pain or sleep in the next 4 hours. These medicines may react with the medicines you were given in the hospital. This could cause a much stronger response than usual.  Follow-up care  Follow up with your healthcare provider if you are not alert and back to your usual level of activity within 12 hours.  When to seek medical advice  Call your healthcare provider right away if any of these occur:  · Drowsiness gets worse  · Weakness or dizziness gets worse  · Repeated vomiting  · You can't be awakened   Date Last Reviewed: 10/18/2016  © 9349-5281 [employer]. [address] [place] [address]. All rights reserved. This information is not intended as a substitute for professional medical care. Always follow your healthcare professional's instructions.

## 2025-03-20 NOTE — NURSING
PT at bedside working with patient   Wound Care RN Inpatient Consult Visit     Reason for RN Consult: right leg wounds/scratch    Wound Care RN Recommendations:  Cleansing: VASHE and gauze    Topical Agent: NONE    Dressing: foam border dressing    Frequency: Change every MONDAY/WEDNESDAY/FRIDAY and prn.      Images of Wounds:        SBAR and plan discussed with bedside nurse Juli MANCIA.    Plan: Discussed with patient's spouse and she recently had her daughter pass away. Per her  she has been \"having nervous scratching\". Wound appear to be clean and the distal wound appears to begin to have epithelium forming. Plan at this time is to clean 3x/week with VASHE and gauze. Pat dry. Cover with foam border dressing. Discussed with patient and  that if wounds not healed in ~3 weeks to contact wound clinic for outpatient visit. Patient and  verbalized understanding. No further follow up needed from wound care perspective.     Time Spent for Consult: 25 minutes with patient